# Patient Record
Sex: FEMALE | Race: BLACK OR AFRICAN AMERICAN | NOT HISPANIC OR LATINO | Employment: STUDENT
[De-identification: names, ages, dates, MRNs, and addresses within clinical notes are randomized per-mention and may not be internally consistent; named-entity substitution may affect disease eponyms.]

---
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
## 2020-10-02 ENCOUNTER — OFFICE VISIT (OUTPATIENT)
Dept: FAMILY MEDICINE | Facility: CLINIC | Age: 20
End: 2020-10-02
Payer: COMMERCIAL

## 2020-10-02 VITALS
BODY MASS INDEX: 17.27 KG/M2 | DIASTOLIC BLOOD PRESSURE: 75 MMHG | HEIGHT: 67 IN | HEART RATE: 77 BPM | TEMPERATURE: 97.7 F | SYSTOLIC BLOOD PRESSURE: 112 MMHG | OXYGEN SATURATION: 100 % | WEIGHT: 110 LBS

## 2020-10-02 DIAGNOSIS — K59.01 SLOW TRANSIT CONSTIPATION: ICD-10-CM

## 2020-10-02 DIAGNOSIS — R10.13 DYSPEPSIA: Primary | ICD-10-CM

## 2020-10-02 PROCEDURE — 99214 OFFICE O/P EST MOD 30 MIN: CPT | Performed by: FAMILY MEDICINE

## 2020-10-02 RX ORDER — OMEPRAZOLE 40 MG/1
40 CAPSULE, DELAYED RELEASE ORAL DAILY
Qty: 90 CAPSULE | Refills: 0 | Status: SHIPPED | OUTPATIENT
Start: 2020-10-02 | End: 2020-12-31

## 2020-10-02 RX ORDER — POLYETHYLENE GLYCOL 3350 17 G/17G
1 POWDER, FOR SOLUTION ORAL DAILY
Qty: 116 G | Refills: 1 | Status: SHIPPED | OUTPATIENT
Start: 2020-10-02 | End: 2021-02-26

## 2020-10-02 SDOH — HEALTH STABILITY: MENTAL HEALTH: HOW OFTEN DO YOU HAVE A DRINK CONTAINING ALCOHOL?: NEVER

## 2020-10-02 ASSESSMENT — MIFFLIN-ST. JEOR: SCORE: 1306.71

## 2020-10-02 NOTE — PROGRESS NOTES
Janeth is a 20 year old  who presents for   Patient presents with:  Abdominal Pain: Patient states she has been having ongoing stomach oain for some months.   Nausea: Patient states she had two episodes of nausea and vomitting. One occuring yesterday and the day before that.       Assessment and Plan      1. Dyspepsia  Likely dyspepsia suspect ulcer disease.  Differential diagnosis could also include nonulcer dyspepsia reflux or gallbladder disease.  Will check Helicobacter bacteria pylori will start with a stool test hold omeprazole until stool test start omeprazole for symptomatic management will continue to add antibiotics if H. pylori positive.  Don't start omeprazole until collecting stool test    - omeprazole (PRILOSEC) 40 MG DR capsule; Take 1 capsule (40 mg) by mouth daily  Dispense: 90 capsule; Refill: 0  - Helicobacter pylori Antigen Stool; Future    2. Slow transit constipation  One scoop of Miralx daily you can start this right away       Return if symptoms worsen or fail to improve, for contact us if not improved .    There are no discontinued medications.      Morgan Fitzpatrick MD         HPI       Janeth is a 20 year old  who presents for   Patient presents with:  Abdominal Pain: Patient states she has been having ongoing stomach oain for some months.   Nausea: Patient states she had two episodes of nausea and vomitting. One occuring yesterday and the day before that.         Visit [place]  1. Stomach pain  Epigastric pain, also constipated, severe pain, worse with hunger though has poor appetite. Getting worse.  Pain is not worsened by exercise seems to be worsened by high acid foods and not others not specifically with laying down does not seem to go up the abdomen has had some vomiting associated with the vomiting has had no blood.  Patient was seen at [facility] for a CBC she also had labs done metabolic profile complete metabolic profile and these were normal reportedly H. pylori was ordered but patient  "never did it.  2. Constipation  Just this week, understands what foods cause it has had poor diet this week.    Problem, Medication and Allergy Lists were reviewed and updated if needed..  Patient is an established patient of this clinic.         Review of Systems:   Review of Systems  7 point review of symptoms was otherwise negative except as noted in HPI         Physical Exam:     Vitals:    10/02/20 1107   BP: 112/75   BP Location: Left arm   Patient Position: Sitting   Cuff Size: Adult Regular   Pulse: 77   Temp: 97.7  F (36.5  C)   TempSrc: Oral   SpO2: 100%   Weight: 49.9 kg (110 lb)   Height: 1.71 m (5' 7.32\")     Body mass index is 17.06 kg/m .  Vitals were reviewed and were normal     Physical Exam  Vitals signs reviewed.   Constitutional:       General: She is not in acute distress.     Appearance: She is well-developed. She is not diaphoretic.   HENT:      Head: Normocephalic.   Eyes:      General: No scleral icterus.     Conjunctiva/sclera: Conjunctivae normal.   Neck:      Musculoskeletal: Normal range of motion.      Thyroid: No thyromegaly.   Cardiovascular:      Rate and Rhythm: Normal rate and regular rhythm.      Heart sounds: Normal heart sounds. No murmur.   Pulmonary:      Effort: Pulmonary effort is normal. No respiratory distress.      Breath sounds: Normal breath sounds. No wheezing.   Abdominal:      General: Abdomen is flat. Bowel sounds are normal. There is no distension.      Palpations: Abdomen is soft. There is no hepatomegaly or splenomegaly.      Tenderness: There is abdominal tenderness ( Mild) in the epigastric area.   Lymphadenopathy:      Cervical: No cervical adenopathy.   Skin:     General: Skin is warm and dry.   Neurological:      Mental Status: She is alert and oriented to person, place, and time.   Psychiatric:         Behavior: Behavior normal.         Thought Content: Thought content normal.         Judgment: Judgment normal.           Results:   Results are ordered and " pending    Options for treatment and follow-up care were reviewed with the patient. Janeth Parra  engaged in the decision making process and verbalized understanding of the options discussed and agreed with the final plan.  Morgan Fitzpatrick MD  [facility]

## 2020-10-02 NOTE — PATIENT INSTRUCTIONS
Here is the plan from today's visit    1. Dyspepsia  Don't start omeprazole until collecting stool test    - omeprazole (PRILOSEC) 40 MG DR capsule; Take 1 capsule (40 mg) by mouth daily  Dispense: 90 capsule; Refill: 0  - Helicobacter pylori Antigen Stool; Future    2. Slow transit constipation  One scoop of Miralx daily you can start this right away      Please call or return to clinic if your symptoms don't go away.    Follow up plan  Return if symptoms worsen or fail to improve, for contact us if not improved .     Thank you for coming to [facility] today.  Lab Testing:  **If you had lab testing today and your results are reassuring or normal they will be mailed to you or sent through [employer] within 7 days.   **If the lab tests need quick action we will call you with the results.  The phone number we will call with results is # [phone] (home) . If this is not the best number please call our clinic and change the number.  Medication Refills:  If you need any refills please call your pharmacy and they will contact us.   If you need to  your refill at a new pharmacy, please contact the new pharmacy directly. The new pharmacy will help you get your medications transferred faster.   Scheduling:  If you have any concerns about today's visit or wish to schedule another appointment please call our office during normal business hours [phone] (8-5:00 M-F)   eferrals to [facility] please call [phone].   Mammogram Scheduling [phone]     XRay/CT/Ultrasound/MRI Scheduling [phone]    Medical Concerns:  If you have urgent medical concerns please call [phone] at any time of the day.    Morgan Fitzpatrick MD

## 2020-10-05 DIAGNOSIS — R10.13 DYSPEPSIA: ICD-10-CM

## 2020-10-05 PROCEDURE — 87338 HPYLORI STOOL AG IA: CPT | Performed by: FAMILY MEDICINE

## 2020-10-06 LAB — H PYLORI AG STL QL IA: POSITIVE

## 2020-10-07 ENCOUNTER — TELEPHONE (OUTPATIENT)
Dept: FAMILY MEDICINE | Facility: CLINIC | Age: 20
End: 2020-10-07

## 2020-10-07 DIAGNOSIS — K26.9 DUODENAL ULCER DUE TO HELICOBACTER PYLORI: Primary | ICD-10-CM

## 2020-10-07 DIAGNOSIS — B96.81 DUODENAL ULCER DUE TO HELICOBACTER PYLORI: Primary | ICD-10-CM

## 2020-10-07 RX ORDER — METRONIDAZOLE 500 MG/1
500 TABLET ORAL 2 TIMES DAILY
Qty: 28 TABLET | Refills: 0 | Status: SHIPPED | OUTPATIENT
Start: 2020-10-07 | End: 2020-10-21

## 2020-10-07 RX ORDER — AMOXICILLIN 500 MG/1
1000 TABLET, FILM COATED ORAL 2 TIMES DAILY
Qty: 56 TABLET | Refills: 0 | Status: SHIPPED | OUTPATIENT
Start: 2020-10-07 | End: 2020-10-21

## 2020-10-07 NOTE — TELEPHONE ENCOUNTER
3:12 PM  Called patient regarding H Pylori result.  Informed patient of plan   She agreed   1. Duodenal ulcer due to Helicobacter pylori    - amoxicillin (AMOXIL) 500 MG tablet; Take 2 tablets (1,000 mg) by mouth 2 times daily for 14 days  Dispense: 56 tablet; Refill: 0  - metroNIDAZOLE (FLAGYL) 500 MG tablet; Take 1 tablet (500 mg) by mouth 2 times daily for 14 days  Dispense: 28 tablet; Refill: 0

## 2020-12-27 ENCOUNTER — HEALTH MAINTENANCE LETTER (OUTPATIENT)
Age: 20
End: 2020-12-27

## 2021-02-26 ENCOUNTER — OFFICE VISIT (OUTPATIENT)
Dept: FAMILY MEDICINE | Facility: CLINIC | Age: 21
End: 2021-02-26
Payer: COMMERCIAL

## 2021-02-26 VITALS
WEIGHT: 115 LBS | TEMPERATURE: 98.1 F | OXYGEN SATURATION: 98 % | BODY MASS INDEX: 17.43 KG/M2 | SYSTOLIC BLOOD PRESSURE: 119 MMHG | HEIGHT: 68 IN | DIASTOLIC BLOOD PRESSURE: 75 MMHG | HEART RATE: 77 BPM

## 2021-02-26 DIAGNOSIS — L70.0 ACNE VULGARIS: ICD-10-CM

## 2021-02-26 DIAGNOSIS — R10.13 EPIGASTRIC PAIN: Primary | ICD-10-CM

## 2021-02-26 DIAGNOSIS — L60.8 DISCOLORED NAILS: ICD-10-CM

## 2021-02-26 DIAGNOSIS — K59.01 SLOW TRANSIT CONSTIPATION: ICD-10-CM

## 2021-02-26 PROCEDURE — 99213 OFFICE O/P EST LOW 20 MIN: CPT | Performed by: FAMILY MEDICINE

## 2021-02-26 RX ORDER — POLYETHYLENE GLYCOL 3350 17 G/17G
1 POWDER, FOR SOLUTION ORAL DAILY
Qty: 850 G | Refills: 3 | Status: SHIPPED | OUTPATIENT
Start: 2021-02-26 | End: 2023-05-24

## 2021-02-26 ASSESSMENT — MIFFLIN-ST. JEOR: SCORE: 1336.89

## 2021-02-26 NOTE — PATIENT INSTRUCTIONS
Abdominal Pain  1. Continue Miralax. Use one capful daily for one to two months then decrease to 1/2 a capful daily.  - Provided refill on MiraLax  2. Prune juice, dried cherries, raisins, spinach can help soften stools  3. If your symptoms haven't improved in two weeks, we will retest for H.pylori.  - Call in two weeks to  stool kit for test if needed - you will need to schedule a lab only appointment     Derm Issue  1. If you experience flaking of the scalp skin or elbows, please let me know.    Acne  1. You can use any topical acne cream for back acne

## 2021-02-26 NOTE — PROGRESS NOTES
Assessment & Plan     Epigastric pain, Slow transit constipation  - Helicobacter pylori Antigen Stool; Future  - polyethylene glycol (MIRALAX) 17 GM/Dose powder; Take 17 g (1 capful) by mouth daily    Discolored nails    Acne vulgaris    Patient Instructions   Abdominal Pain  1. Continue Miralax. Use one capful daily for one to two months then decrease to 1/2 a capful daily.  - Provided refill on MiraLax  2. Prune juice, dried cherries, raisins, spinach can help soften stools  3. If your symptoms haven't improved in two weeks, we will retest for H.pylori.  - Call in two weeks to  stool kit for test if needed - you will need to schedule a lab only appointment     Derm Issue  1. If you experience flaking of the scalp skin or elbows, please let me know.    Acne  1. You can use any topical acne cream for back acne       Follow up PRN.    No follow-ups on file.    Marta Garrett MD  [facility] KENJI Fowler is a 21 year old who presents for the following health issues  accompanied by her :    HPI     Derm  Noticed line through right nail and left toenail in 2020. Has not been treated for malaria. Traveled to [place] in [phone], experienced constipation during travel but no other sxs.    GI  Reports poor appetite r/t abd pain that started in Oct. 2020. Tested positive in Oct. for H.pylori (stool test), took ABx for 14 days with improvement, but then abd pain returned two weeks ago and sxs have been worsening since. Points to the epigastrium as the region of pain. Taking MiraLax and prune juice for two days with improvement, no other medications. Denies heart burn, melena, hematochezia and tarry/sticky stools. Endorses occasional nausea, dizziness, poor sleep r/t abd pain.    Social Hx  7 siblings, lives with family. 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       Review of Systems     Negative for: heart burn, melena, hematochezia and tarry/sticky stools    Positive  "for: occasional nausea, dizziness, poor sleep r/t abd pain.    See HPI for additional sxs.    This document serves as a record of the services and decisions personally performed and made by Marta Garrett MD. It was created on his/her behalf by Zafar Reyes, a trained medical scribe. The creation of this document is based the provider's statements to the medical scribe.  Scribe Zafar Reyes 11:15 AM, February 26, 2021        Objective    Blood Pressure 119/75   Pulse 77   Temperature 98.1  F (36.7  C) (Oral)   Height 1.73 m (5' 8.11\")   Weight 52.2 kg (115 lb)   Last Menstrual Period 02/03/2021 (Exact Date)   Oxygen Saturation 98%   Body Mass Index 17.43 kg/m    Body mass index is 17.43 kg/m .   Vitals were reviewed and were normal.    Wt Readings from Last 10 Encounters:   02/26/21 52.2 kg (115 lb)   10/02/20 49.9 kg (110 lb)   04/05/16 50.3 kg (111 lb) (32 %, Z= -0.48)*     * Growth percentiles are based on CDC (Girls, 2-20 Years) data.     Physical Exam   GENERAL: healthy, alert and no distress  RESP: lungs clear to auscultation - no rales, rhonchi or wheezes  CV: regular rate and rhythm, normal S1 S2, no S3 or S4, no murmur, click or rub, no peripheral edema and peripheral pulses strong  ABDOMEN:  bowel sounds normal. abd is soft, non tender, points to LUQ to periumblical region as site of pain. Palpable loop of bowel in LLQ, abd feels slightly full.  SKIN: Right hand second digit 2 mm wide band melanotic line through the nail plate extending all the way to the cuticle.    Second left toe -  1 mm wide band melanotic line throught the nail plate extending all the way to the cuticle   Some mild pap pustle acne upper back  PSYCH: mentation appears normal, affect normal  "

## 2021-03-06 ENCOUNTER — HEALTH MAINTENANCE LETTER (OUTPATIENT)
Age: 21
End: 2021-03-06

## 2021-10-09 ENCOUNTER — HEALTH MAINTENANCE LETTER (OUTPATIENT)
Age: 21
End: 2021-10-09

## 2022-01-17 ENCOUNTER — OFFICE VISIT (OUTPATIENT)
Dept: FAMILY MEDICINE | Facility: CLINIC | Age: 22
End: 2022-01-17
Payer: COMMERCIAL

## 2022-01-17 VITALS
RESPIRATION RATE: 16 BRPM | BODY MASS INDEX: 16.91 KG/M2 | HEIGHT: 68 IN | WEIGHT: 111.6 LBS | HEART RATE: 76 BPM | DIASTOLIC BLOOD PRESSURE: 70 MMHG | SYSTOLIC BLOOD PRESSURE: 110 MMHG | TEMPERATURE: 98.1 F | OXYGEN SATURATION: 100 %

## 2022-01-17 DIAGNOSIS — H93.11 TINNITUS, RIGHT: Primary | ICD-10-CM

## 2022-01-17 DIAGNOSIS — Z23 NEED FOR PROPHYLACTIC VACCINATION AND INOCULATION AGAINST INFLUENZA: ICD-10-CM

## 2022-01-17 DIAGNOSIS — H90.3 ASYMMETRICAL SENSORINEURAL HEARING LOSS: ICD-10-CM

## 2022-01-17 PROCEDURE — 90686 IIV4 VACC NO PRSV 0.5 ML IM: CPT | Performed by: FAMILY MEDICINE

## 2022-01-17 PROCEDURE — 99213 OFFICE O/P EST LOW 20 MIN: CPT | Mod: 25 | Performed by: FAMILY MEDICINE

## 2022-01-17 PROCEDURE — 90471 IMMUNIZATION ADMIN: CPT | Performed by: FAMILY MEDICINE

## 2022-01-17 RX ORDER — NEOMYCIN SULFATE, POLYMYXIN B SULFATE, HYDROCORTISONE 3.5; 10000; 1 MG/ML; [USP'U]/ML; MG/ML
SOLUTION/ DROPS AURICULAR (OTIC)
COMMUNITY
Start: 2022-01-06 | End: 2023-05-04

## 2022-01-17 RX ORDER — CEFDINIR 300 MG/1
CAPSULE ORAL
COMMUNITY
Start: 2022-01-06 | End: 2023-05-04

## 2022-01-17 ASSESSMENT — MIFFLIN-ST. JEOR: SCORE: 1316.46

## 2022-01-17 NOTE — PATIENT INSTRUCTIONS
Patient Education   Here is the plan from today's visit    1. Tinnitus, right  2. Asymmetrical sensorineural hearing loss  Normal to have these symptoms for up to a month after an ear infection. If symptoms persist past 2/6/22, you should see the ear specialist - referral placed today  - Otolaryngology Referral; Future    Please call or return to clinic if your symptoms don't go away.    Follow up plan  No follow-ups on file.    Thank you for coming to [facility] today.  Lab Testing:  **If you had lab testing today and your results are reassuring or normal they will be mailed to you or sent through [employer] within 7 days.   **If the lab tests need quick action we will call you with the results.  **If you are having labs done on a different day, please call [phone] to schedule at [facility] or [phone] for other [facility] Outpatient Lab locations. Labs do not offer walk-in appointments.  The phone number we will call with results is # [phone] (home) . If this is not the best number please call our clinic and change the number.  Medication Refills:  If you need any refills please call your pharmacy and they will contact us.   If you need to  your refill at a new pharmacy, please contact the new pharmacy directly. The new pharmacy will help you get your medications transferred faster.   Scheduling:  If you have any concerns about today's visit or wish to schedule another appointment please call our office during normal business hours [phone] (8-5:00 M-F)  If a referral was made to an [facility] specialty provider and you do not get a call from central scheduling, please refer to directions on your visit summary or call our office during normal business hours for assistance.   If a Mammogram was ordered for you at the [facility] call [phone] to schedule or change your appointment.  If you had an XRay/CT/Ultrasound/MRI ordered the number is [phone] to schedule or  change your radiology appointment.   [facility] has limited ultrasound appointments available on Wednesdays, if you would like your ultrasound at [facility], please call [phone] to schedule.   Medical Concerns:  If you have urgent medical concerns please call [phone] at any time of the day.    Renetta Samayoa MD

## 2022-01-17 NOTE — PROGRESS NOTES
Assessment & Plan   Patient Instructions   Patient Education   Here is the plan from today's visit    1. Tinnitus, right  2. Asymmetrical sensorineural hearing loss  Normal to have these symptoms for up to a month after an ear infection. If symptoms persist past 2/6/22, you should see the ear specialist - referral placed today  - Otolaryngology Referral; Future    20 minutes spent on the date of the encounter doing chart review, history and exam, documentation and further activities per the note      No follow-ups on file.    Renetta Samayoa MD  [facility] KENJI Fowler is a 22 year old who presents for the following health issues   HPI     1/6/22 ED/UC visit with acute otitis externa. Prescribed cortisporin drops and PO cefdinir for 7 days - completed course. No change in symptoms  Continues to have Right tinnitus and decreased hearing acuity and drainage.   No fevers  11/30/21 Clinic visit with acute suppurative otitis media right. Prescribed augmentin x 7 days and zofran.       Review of Systems         Objective    There were no vitals taken for this visit.  There is no height or weight on file to calculate BMI.  Physical Exam  HENT:      Right Ear: Ear canal and external ear normal. No drainage. Tympanic membrane is not injected or perforated.      Left Ear: Ear canal and external ear normal. No drainage. Tympanic membrane is not injected or perforated.      Ears:      Comments: Mclaughlin lateralizes to the left. Air>Bone conduction bilaterally.

## 2022-01-23 ENCOUNTER — HEALTH MAINTENANCE LETTER (OUTPATIENT)
Age: 22
End: 2022-01-23

## 2022-09-06 ENCOUNTER — TRANSFERRED RECORDS (OUTPATIENT)
Dept: HEALTH INFORMATION MANAGEMENT | Facility: CLINIC | Age: 22
End: 2022-09-06

## 2022-09-11 ENCOUNTER — HEALTH MAINTENANCE LETTER (OUTPATIENT)
Age: 22
End: 2022-09-11

## 2022-09-20 NOTE — TELEPHONE ENCOUNTER
FUTURE VISIT INFORMATION      FUTURE VISIT INFORMATION:    Date: 10/25/22    Time: 9:00am    Location: [facility]  REFERRAL INFORMATION:    Referring provider:  Renetta Samayoa MD    Referring providers clinic:  [facility]    Reason for visit/diagnosis  Pt states that she is not having ringing just will like her ears cleaned . Declined ENT    RECORDS REQUESTED FROM:       Clinic name Comments Records Status Imaging Status   [facility]  OV/referral 1/17/22- Renetta Samayoa MD [facility] ED ED visit 1/6/22 [facility]

## 2022-10-25 ENCOUNTER — PRE VISIT (OUTPATIENT)
Dept: SURGERY | Facility: CLINIC | Age: 22
End: 2022-10-25

## 2022-10-25 ENCOUNTER — OFFICE VISIT (OUTPATIENT)
Dept: OTOLARYNGOLOGY | Facility: CLINIC | Age: 22
End: 2022-10-25
Attending: FAMILY MEDICINE
Payer: COMMERCIAL

## 2022-10-25 VITALS
HEIGHT: 67 IN | SYSTOLIC BLOOD PRESSURE: 116 MMHG | DIASTOLIC BLOOD PRESSURE: 75 MMHG | WEIGHT: 120 LBS | OXYGEN SATURATION: 100 % | BODY MASS INDEX: 18.83 KG/M2 | HEART RATE: 77 BPM

## 2022-10-25 DIAGNOSIS — H93.11 TINNITUS, RIGHT: ICD-10-CM

## 2022-10-25 DIAGNOSIS — H90.3 ASYMMETRICAL SENSORINEURAL HEARING LOSS: ICD-10-CM

## 2022-10-25 PROCEDURE — 92504 EAR MICROSCOPY EXAMINATION: CPT | Performed by: REGISTERED NURSE

## 2022-10-25 PROCEDURE — 99203 OFFICE O/P NEW LOW 30 MIN: CPT | Mod: 25 | Performed by: REGISTERED NURSE

## 2022-10-25 RX ORDER — CIPROFLOXACIN AND DEXAMETHASONE 3; 1 MG/ML; MG/ML
SUSPENSION/ DROPS AURICULAR (OTIC)
COMMUNITY
Start: 2022-10-19 | End: 2023-05-04

## 2022-10-25 ASSESSMENT — PAIN SCALES - GENERAL: PAINLEVEL: EXTREME PAIN (8)

## 2022-10-25 NOTE — NURSING NOTE
"Chief Complaint   Patient presents with     Consult     Ear pain         Blood pressure 116/75, pulse 77, height 1.702 m (5' 7\"), weight 54.4 kg (120 lb), SpO2 100 %, not currently breastfeeding.    Tonia Ambrosio, EMT    "

## 2022-10-25 NOTE — PROGRESS NOTES
Otolaryngology Clinic  October 25, 2022    Chief Complaint:   Right tinnitus and dizziness       History of Present Illness:   Janeth Parra is a 22 year old female who presents today for evaluation of right tinnitus and dizziness. Patient scheduled for an ear cleaning per appointment notes. No audiogram completed prior to this visit. Patient reports that she has had these symptoms since experiencing acute otitis externa on 1/6/22. Was seen by PCP in late January 2022 with recommendations to consult with ENT if symptoms did not improve over the course of a couple of weeks.     Today, patient reports ongoing bothersome right tinnitus and decreased hearing on the right side. More bothersome, however, is patient's severe dizziness and vertigo. Patient in bed for an entire day yesterday due to vertigo. Typically lasts a few hours a day.     Patient denies any otalgia, otorrhea, facial numbness/weakness, history of frequent ear infections, or ear surgeries.      Past Medical History:  No past medical history on file.    Past Surgical History:  No past surgical history on file.    Medications:  Current Outpatient Medications   Medication Sig Dispense Refill     amoxicillin-clavulanate (AUGMENTIN) 875-125 MG tablet        CIPRODEX 0.3-0.1 % otic suspension        cefdinir (OMNICEF) 300 MG capsule  (Patient not taking: Reported on 1/17/2022)       neomycin-polymyxin-hydrocortisone (CORTISPORIN) 3.5-96919-1 otic solution  (Patient not taking: Reported on 1/17/2022)       polyethylene glycol (MIRALAX) 17 GM/Dose powder Take 17 g (1 capful) by mouth daily (Patient not taking: Reported on 10/25/2022) 850 g 3       Allergies:  No Known Allergies     Social History:  Social History     Tobacco Use     Smoking status: Never     Smokeless tobacco: Never   Substance Use Topics     Alcohol use: Never     Drug use: Never       ROS: 10 point ROS neg other than the symptoms noted above in the HPI.    Physical Exam:    /75  "(BP Location: Right arm, Patient Position: Sitting, Cuff Size: Adult Regular)   Pulse 77   Ht 1.702 m (5' 7\")   Wt 54.4 kg (120 lb)   SpO2 100%   BMI 18.79 kg/m       Constitutional:  The patient was well-groomed, and in no acute distress.     Neurologic: Alert and oriented x 3.  CN's III-XII within normal limits.  Voice normal.   Psychiatric: The patient's affect was calm, cooperative, and appropriate.     Communication:  Normal; communicates verbally, normal voice quality.    Respiratory: Breathing comfortably without stridor or exertion of accessory muscles.    Ears: Pinnae and tragus non-tender.  EAC's and TM's were clear.  Mclaughlin: lateralizes to left. Rinne: A>B bilaterally      Otologic microscope exam:    Right ear was examined under the microscope.  Normal appearing TM, nicely aerated middle ear space.  Left ear was also examined under the microscope.  Normal appearing TM, nicely aerated middle ear space.       Assessment and Plan:  1. Tinnitus, right  Presents today requesting ear cleaning due to right tinnitus and hearing loss. Ear canals are clean and dry without cerumen. No effusion present on exam. Tuning fork exam is abnormal. Recommend that patient obtain an audiogram and return to clinic for appointment to review audiogram.    2. Vertigo  Will review audiogram. Patient may require further balance testing but will review again after audiogram.      Patient will follow up after audiogram.    Alena Simmons DNP, APRN, CNP  Otolaryngology  Head & Neck Surgery  [phone]    30 minutes spent on the date of the encounter doing chart review, history and exam, documentation and further activities per the note    "

## 2022-10-25 NOTE — PATIENT INSTRUCTIONS
- You were seen in the ENT Clinic today by Alena Simmons NP.   - The plan is follow up with audiogram prior to appointment.  - Please send a [employer] message or call the ENT clinic at [phone] with any questions or concerns.

## 2022-10-25 NOTE — LETTER
10/25/2022       RE: Janeth Parra  [address]   [place] [facility] [place] [address]     Dear Colleague,    Thank you for referring your patient, Janeth Parra, to the [facility] [place] [facility]. Please see a copy of my visit note below.      Otolaryngology Clinic  October 25, 2022    Chief Complaint:   Right tinnitus and dizziness       History of Present Illness:   Janeth Parra is a 22 year old female who presents today for evaluation of right tinnitus and dizziness. Patient scheduled for an ear cleaning per appointment notes. No audiogram completed prior to this visit. Patient reports that she has had these symptoms since experiencing acute otitis externa on 1/6/22. Was seen by PCP in late January 2022 with recommendations to consult with ENT if symptoms did not improve over the course of a couple of weeks.     Today, patient reports ongoing bothersome right tinnitus and decreased hearing on the right side. More bothersome, however, is patient's severe dizziness and vertigo. Patient in bed for an entire day yesterday due to vertigo. Typically lasts a few hours a day.     Patient denies any otalgia, otorrhea, facial numbness/weakness, history of frequent ear infections, or ear surgeries.      Past Medical History:  No past medical history on file.    Past Surgical History:  No past surgical history on file.    Medications:  Current Outpatient Medications   Medication Sig Dispense Refill     amoxicillin-clavulanate (AUGMENTIN) 875-125 MG tablet        CIPRODEX 0.3-0.1 % otic suspension        cefdinir (OMNICEF) 300 MG capsule  (Patient not taking: Reported on 1/17/2022)       neomycin-polymyxin-hydrocortisone (CORTISPORIN) 3.5-78772-8 otic solution  (Patient not taking: Reported on 1/17/2022)       polyethylene glycol (MIRALAX) 17 GM/Dose powder Take 17 g (1 capful) by mouth daily (Patient not taking: Reported on  "10/25/2022) 850 g 3       Allergies:  No Known Allergies     Social History:  Social History     Tobacco Use     Smoking status: Never     Smokeless tobacco: Never   Substance Use Topics     Alcohol use: Never     Drug use: Never       ROS: 10 point ROS neg other than the symptoms noted above in the HPI.    Physical Exam:    /75 (BP Location: Right arm, Patient Position: Sitting, Cuff Size: Adult Regular)   Pulse 77   Ht 1.702 m (5' 7\")   Wt 54.4 kg (120 lb)   SpO2 100%   BMI 18.79 kg/m       Constitutional:  The patient was well-groomed, and in no acute distress.     Neurologic: Alert and oriented x 3.  CN's III-XII within normal limits.  Voice normal.   Psychiatric: The patient's affect was calm, cooperative, and appropriate.     Communication:  Normal; communicates verbally, normal voice quality.    Respiratory: Breathing comfortably without stridor or exertion of accessory muscles.    Ears: Pinnae and tragus non-tender.  EAC's and TM's were clear.  Mclaughlin: lateralizes to left. Rinne: A>B bilaterally      Otologic microscope exam:    Right ear was examined under the microscope.  Normal appearing TM, nicely aerated middle ear space.  Left ear was also examined under the microscope.  Normal appearing TM, nicely aerated middle ear space.       Assessment and Plan:  1. Tinnitus, right  Presents today requesting ear cleaning due to right tinnitus and hearing loss. Ear canals are clean and dry without cerumen. No effusion present on exam. Tuning fork exam is abnormal. Recommend that patient obtain an audiogram and return to clinic for appointment to review audiogram.    2. Vertigo  Will review audiogram. Patient may require further balance testing but will review again after audiogram.      Patient will follow up after audiogram.    Alena Simmons DNP, APRN, CNP  Otolaryngology  Head & Neck Surgery  [phone]    30 minutes spent on the date of the encounter doing chart review, history and exam, documentation " and further activities per the note      Again, thank you for allowing me to participate in the care of your patient.      Sincerely,    Julia Simmons, NP

## 2022-11-02 DIAGNOSIS — H93.11 TINNITUS, RIGHT: Primary | ICD-10-CM

## 2022-11-03 DIAGNOSIS — H93.11 TINNITUS, RIGHT: Primary | ICD-10-CM

## 2022-11-08 ENCOUNTER — OFFICE VISIT (OUTPATIENT)
Dept: AUDIOLOGY | Facility: CLINIC | Age: 22
End: 2022-11-08
Payer: COMMERCIAL

## 2022-11-08 ENCOUNTER — OFFICE VISIT (OUTPATIENT)
Dept: OTOLARYNGOLOGY | Facility: CLINIC | Age: 22
End: 2022-11-08
Payer: COMMERCIAL

## 2022-11-08 VITALS
HEART RATE: 83 BPM | WEIGHT: 122 LBS | OXYGEN SATURATION: 100 % | DIASTOLIC BLOOD PRESSURE: 74 MMHG | SYSTOLIC BLOOD PRESSURE: 117 MMHG | TEMPERATURE: 97.5 F | BODY MASS INDEX: 19.11 KG/M2

## 2022-11-08 DIAGNOSIS — H91.20 SUDDEN-ONSET SENSORINEURAL HEARING LOSS: Primary | ICD-10-CM

## 2022-11-08 DIAGNOSIS — H90.3 ASYMMETRICAL SENSORINEURAL HEARING LOSS: ICD-10-CM

## 2022-11-08 DIAGNOSIS — R42 VERTIGO: ICD-10-CM

## 2022-11-08 DIAGNOSIS — H90.41 SENSORINEURAL HEARING LOSS (SNHL) OF RIGHT EAR WITH UNRESTRICTED HEARING OF LEFT EAR: Primary | ICD-10-CM

## 2022-11-08 PROCEDURE — 92557 COMPREHENSIVE HEARING TEST: CPT | Performed by: AUDIOLOGIST-HEARING AID FITTER

## 2022-11-08 PROCEDURE — 92565 STENGER TEST PURE TONE: CPT | Performed by: AUDIOLOGIST-HEARING AID FITTER

## 2022-11-08 PROCEDURE — 92550 TYMPANOMETRY & REFLEX THRESH: CPT | Performed by: AUDIOLOGIST-HEARING AID FITTER

## 2022-11-08 PROCEDURE — 99214 OFFICE O/P EST MOD 30 MIN: CPT | Performed by: REGISTERED NURSE

## 2022-11-08 RX ORDER — PREDNISONE 20 MG/1
TABLET ORAL
Qty: 40 TABLET | Refills: 0 | Status: SHIPPED | OUTPATIENT
Start: 2022-11-08 | End: 2023-05-04

## 2022-11-08 ASSESSMENT — PAIN SCALES - GENERAL: PAINLEVEL: EXTREME PAIN (9)

## 2022-11-08 NOTE — PROGRESS NOTES
AUDIOLOGY REPORT    SUMMARY: Audiology visit completed. See audiogram for results.      RECOMMENDATIONS: Follow-up with ENT.      Bashir Jang, CCC-A, [facility]  Licensed Audiologist  [place] #[id number]

## 2022-11-08 NOTE — LETTER
11/8/2022       RE: Janeth Parra  [address]   [facility] [address]     Dear Colleague,    Thank you for referring your patient, Janeth Parra, to the [facility] [place] [facility]. Please see a copy of my visit note below.      Otolaryngology Clinic  November 8, 2022    Chief Complaint:   Right tinnitus and hearing loss       History of Present Illness:   Janeth Parra is a 22 year old female who presents today for right tinnitus and sudden hearing loss.  Patient states that symptoms started in early January 2022.  Patient reports a right ear infection and hearing loss that has never recovered.  Patient has been seen by multiple outside providers and, unfortunately, we do not have the clinic notes from those visits at today's exam with exception to an audiology visit in May 2022 which reported mild sensory hearing loss in the right ear and normal hearing in the left ear with 100% word recognition score on the right side at 65 dB.    Patient reports that she recently had an MRI which did not find any retrocochlear lesions that could be causing patient's hearing loss.    Patient reports that since June 2022 she feels that her right-sided hearing is getting worse and the tinnitus on the right side is becoming very loud.  She is also now having episodes of vertigo that last minutes to days at a time. Denies any vertigo today. Last vertigo episode was last week. Patient reports a pain in the right ear which, upon further review, seems to be the loud tinnitus that she is hearing in that ear.    Patient denies any otorrhea, facial numbness/weakness, history of frequent ear infections, or ear surgeries.  Patient denies any significant noise trauma, family history of hearing loss, or history of autoimmune disease.    Past Medical History:  No past medical history on file.    Past Surgical History:  No past  surgical history on file.    Medications:  Current Outpatient Medications   Medication Sig Dispense Refill     omeprazole (PRILOSEC) 20 MG DR capsule Take 1 capsule (20 mg) by mouth daily 30 capsule 0     predniSONE (DELTASONE) 20 MG tablet Prednisone taper: Take 3 tablets (60 mg) by mouth daily x 10 days. Then take 2.5 tablets (50 mg) daily x 1 day. Then take 2 tablets (40 mg) daily x 1 day. Then take 1.5 tablets (30 mg) daily x 1 day. Then take 1 tablet (20 mg) daily x 1 day.Then take 0.5 tablet (10 mg) daily x 1 day 40 tablet 0     amoxicillin-clavulanate (AUGMENTIN) 875-125 MG tablet  (Patient not taking: Reported on 11/8/2022)       cefdinir (OMNICEF) 300 MG capsule  (Patient not taking: Reported on 1/17/2022)       CIPRODEX 0.3-0.1 % otic suspension  (Patient not taking: Reported on 11/8/2022)       neomycin-polymyxin-hydrocortisone (CORTISPORIN) 3.5-15473-2 otic solution  (Patient not taking: Reported on 1/17/2022)       polyethylene glycol (MIRALAX) 17 GM/Dose powder Take 17 g (1 capful) by mouth daily (Patient not taking: Reported on 10/25/2022) 850 g 3       Allergies:  No Known Allergies     Social History:  Social History     Tobacco Use     Smoking status: Never     Smokeless tobacco: Never   Substance Use Topics     Alcohol use: Never     Drug use: Never       ROS: 10 point ROS neg other than the symptoms noted above in the HPI.    Physical Exam:    /74 (BP Location: Left arm, Patient Position: Sitting, Cuff Size: Adult Regular)   Pulse 83   Temp 97.5  F (36.4  C) (Temporal)   Wt 55.3 kg (122 lb)   SpO2 100%   BMI 19.11 kg/m       Constitutional:  The patient was well-groomed and in no acute distress.     Neurologic: Alert and oriented x 3.  CN's III-XII within normal limits.  Voice normal.   Psychiatric: The patient's affect was calm, cooperative, and appropriate.    Communication:  Normal; communicates verbally, normal voice quality.   Respiratory: Breathing comfortably without stridor or  exertion of accessory muscles.    Head/Face:  Normocephalic and atraumatic.  No lesions or scars.    Eyes: Pupils were equal and reactive.  Extraocular movement intact.   Ears: Pinnae and tragus non-tender.  EAC's and TM's were clear.       Otologic microscope exam:    Right ear was examined under the microscope.  Normal appearing TM, nicely aerated middle ear space.    Left ear was also examined under the microscope.  Normal appearing TM, nicely aerated middle ear space.       Audiogram: 11/8/2022 - data independently reviewed  Right ear: Moderate rising to mild SNHL   Left ear: Normal heairng   WR right: 44% @ 80 dB left: 100% @ 50 dB  Acoustic Reflexes: present in all conditions  Tympanograms: type A right, type A left        Assessment and Plan:  1. Sudden-onset sensorineural hearing loss  Patient with asymmetric sensorineural hearing loss with tinnitus and vertigo. From patient's history, it seems that hearing is fluctuating and deteriorating over the course of the past few months. Outside MRI is normal per patient report that she showed me on her phone today.     I am suspicious that this may be a Menieres or hydrops process. Because hearing is fluctuating, discussed trying a high dose steroid burst to treat any sudden loss that may be contributing to worsening hearing and word recognition score.     Also discussed Menieres management including low salt diet, Dyazide, and IT injections in acute phases.     Patient will start oral steroid burst and return to clinic in 2-3 weeks for recheck including an audiogram prior to appointment.     - predniSONE (DELTASONE) 20 MG tablet; Prednisone taper: Take 3 tablets (60 mg) by mouth daily x 10 days. Then take 2.5 tablets (50 mg) daily x 1 day. Then take 2 tablets (40 mg) daily x 1 day. Then take 1.5 tablets (30 mg) daily x 1 day. Then take 1 tablet (20 mg) daily x 1 day.Then take 0.5 tablet (10 mg) daily x 1 day  Dispense: 40 tablet; Refill: 0  - omeprazole (PRILOSEC)  20 MG DR capsule; Take 1 capsule (20 mg) by mouth daily  Dispense: 30 capsule; Refill: 0     Alena Simmons DNP, APRN, CNP  Otolaryngology  Head & Neck Surgery  [phone]    30 minutes spent on the date of the encounter doing chart review, history and exam, documentation and further activities per the note      Again, thank you for allowing me to participate in the care of your patient.      Sincerely,    Julia Simmons, NP

## 2022-11-08 NOTE — PROGRESS NOTES
Otolaryngology Clinic  November 8, 2022    Chief Complaint:   Right tinnitus and hearing loss       History of Present Illness:   Janeth Parra is a 22 year old female who presents today for right tinnitus and sudden hearing loss.  Patient states that symptoms started in early January 2022.  Patient reports a right ear infection and hearing loss that has never recovered.  Patient has been seen by multiple outside providers and, unfortunately, we do not have the clinic notes from those visits at today's exam with exception to an audiology visit in May 2022 which reported mild sensory hearing loss in the right ear and normal hearing in the left ear with 100% word recognition score on the right side at 65 dB.    Patient reports that she recently had an MRI which did not find any retrocochlear lesions that could be causing patient's hearing loss.    Patient reports that since June 2022 she feels that her right-sided hearing is getting worse and the tinnitus on the right side is becoming very loud.  She is also now having episodes of vertigo that last minutes to days at a time. Denies any vertigo today. Last vertigo episode was last week. Patient reports a pain in the right ear which, upon further review, seems to be the loud tinnitus that she is hearing in that ear.    Patient denies any otorrhea, facial numbness/weakness, history of frequent ear infections, or ear surgeries.  Patient denies any significant noise trauma, family history of hearing loss, or history of autoimmune disease.    Past Medical History:  No past medical history on file.    Past Surgical History:  No past surgical history on file.    Medications:  Current Outpatient Medications   Medication Sig Dispense Refill     omeprazole (PRILOSEC) 20 MG DR capsule Take 1 capsule (20 mg) by mouth daily 30 capsule 0     predniSONE (DELTASONE) 20 MG tablet Prednisone taper: Take 3 tablets (60 mg) by mouth daily x 10 days. Then take 2.5 tablets (50 mg)  daily x 1 day. Then take 2 tablets (40 mg) daily x 1 day. Then take 1.5 tablets (30 mg) daily x 1 day. Then take 1 tablet (20 mg) daily x 1 day.Then take 0.5 tablet (10 mg) daily x 1 day 40 tablet 0     amoxicillin-clavulanate (AUGMENTIN) 875-125 MG tablet  (Patient not taking: Reported on 11/8/2022)       cefdinir (OMNICEF) 300 MG capsule  (Patient not taking: Reported on 1/17/2022)       CIPRODEX 0.3-0.1 % otic suspension  (Patient not taking: Reported on 11/8/2022)       neomycin-polymyxin-hydrocortisone (CORTISPORIN) 3.5-76141-2 otic solution  (Patient not taking: Reported on 1/17/2022)       polyethylene glycol (MIRALAX) 17 GM/Dose powder Take 17 g (1 capful) by mouth daily (Patient not taking: Reported on 10/25/2022) 850 g 3       Allergies:  No Known Allergies     Social History:  Social History     Tobacco Use     Smoking status: Never     Smokeless tobacco: Never   Substance Use Topics     Alcohol use: Never     Drug use: Never       ROS: 10 point ROS neg other than the symptoms noted above in the HPI.    Physical Exam:    /74 (BP Location: Left arm, Patient Position: Sitting, Cuff Size: Adult Regular)   Pulse 83   Temp 97.5  F (36.4  C) (Temporal)   Wt 55.3 kg (122 lb)   SpO2 100%   BMI 19.11 kg/m       Constitutional:  The patient was well-groomed and in no acute distress.     Neurologic: Alert and oriented x 3.  CN's III-XII within normal limits.  Voice normal.   Psychiatric: The patient's affect was calm, cooperative, and appropriate.    Communication:  Normal; communicates verbally, normal voice quality.   Respiratory: Breathing comfortably without stridor or exertion of accessory muscles.    Head/Face:  Normocephalic and atraumatic.  No lesions or scars.    Eyes: Pupils were equal and reactive.  Extraocular movement intact.   Ears: Pinnae and tragus non-tender.  EAC's and TM's were clear.       Otologic microscope exam:    Right ear was examined under the microscope.  Normal appearing TM,  nicely aerated middle ear space.    Left ear was also examined under the microscope.  Normal appearing TM, nicely aerated middle ear space.       Audiogram: 11/8/2022 - data independently reviewed  Right ear: Moderate rising to mild SNHL   Left ear: Normal heairng   WR right: 44% @ 80 dB left: 100% @ 50 dB  Acoustic Reflexes: present in all conditions  Tympanograms: type A right, type A left        Assessment and Plan:  1. Sudden-onset sensorineural hearing loss  Patient with asymmetric sensorineural hearing loss with tinnitus and vertigo. From patient's history, it seems that hearing is fluctuating and deteriorating over the course of the past few months. Outside MRI is normal per patient report that she showed me on her phone today.     I am suspicious that this may be a Menieres or hydrops process. Because hearing is fluctuating, discussed trying a high dose steroid burst to treat any sudden loss that may be contributing to worsening hearing and word recognition score.     Also discussed Menieres management including low salt diet, Dyazide, and IT injections in acute phases.     Patient will start oral steroid burst and return to clinic in 2-3 weeks for recheck including an audiogram prior to appointment.     - predniSONE (DELTASONE) 20 MG tablet; Prednisone taper: Take 3 tablets (60 mg) by mouth daily x 10 days. Then take 2.5 tablets (50 mg) daily x 1 day. Then take 2 tablets (40 mg) daily x 1 day. Then take 1.5 tablets (30 mg) daily x 1 day. Then take 1 tablet (20 mg) daily x 1 day.Then take 0.5 tablet (10 mg) daily x 1 day  Dispense: 40 tablet; Refill: 0  - omeprazole (PRILOSEC) 20 MG DR capsule; Take 1 capsule (20 mg) by mouth daily  Dispense: 30 capsule; Refill: 0     Alena Simmons DNP, APRN, CNP  Otolaryngology  Head & Neck Surgery  [phone]    30 minutes spent on the date of the encounter doing chart review, history and exam, documentation and further activities per the note

## 2022-11-18 ENCOUNTER — THERAPY VISIT (OUTPATIENT)
Dept: PHYSICAL THERAPY | Facility: CLINIC | Age: 22
End: 2022-11-18
Attending: REGISTERED NURSE
Payer: COMMERCIAL

## 2022-11-18 DIAGNOSIS — R42 VERTIGO: ICD-10-CM

## 2022-11-18 PROCEDURE — 97112 NEUROMUSCULAR REEDUCATION: CPT | Mod: GP | Performed by: PHYSICAL THERAPIST

## 2022-11-18 PROCEDURE — 97161 PT EVAL LOW COMPLEX 20 MIN: CPT | Mod: GP | Performed by: PHYSICAL THERAPIST

## 2022-11-18 NOTE — DISCHARGE INSTRUCTIONS
Next appointments with me:  Tuesday 11/22 at 1:45PM  Thursday 12/1 at 10:15AM    -Eye exercise 3 times a day for 1 minute each  - Monitor the vertigo when laying down/getting up out of bed. We will look at that again next time    Myra Joshua PT, DPT  Physical Therapist  [facility] [place] [address] [place] [address]  [email or web address]  Appointments: [phone]

## 2022-11-21 NOTE — PROGRESS NOTES
11/18/22 1000   Quick Adds   Quick Adds Vestibular Eval   General Information   Start of Care Date 11/18/22   Referring Physician Julia Simmons NP   Orders Evaluate and Treat as Indicated   Order Date 11/08/22   Medical Diagnosis Vertigo   Onset of illness/injury or Date of Surgery 11/08/22   Surgical/Medical history reviewed Yes   Pertinent history of current problem (include personal factors and/or comorbidities that impact the POC) Initial episode of sudden R sided hearing loss in January 2022. Patient reports COVID in 12/2022. Patient reports vertigo when getting out of bed- associated with nausea, imbalance, lasting 20-25 minutes, occurs every morning/night. Other symptoms include headaches (usually associated with dizziness)- significant increase since 2022. Patient reports significant tinnitus in R ear, but also can hear heartbeat in her right ear. Sensitivity to sounds, as well, but does not usually provoke dizziness. Occasional sensitivity to light- most notable when having headaches. Patient denies difficulty swallowing, speaking.   Pertinent Visual History  Some blurry vision reported- mostly in busy environments   Prior level of function comment Independent-active   Patient role/Employment history Student   Assistive Devices Comments None   Patient/Family Goals Statement To decrease dizziness   Fall Risk Screen   Fall screen completed by PT   Have you fallen 2 or more times in the past year? No   Have you fallen and had an injury in the past year? No   Is patient a fall risk? No   Functional Scales   Functional Scales and Outcomes DHI 32   Cognitive Status Examination   Orientation orientation to person, place and time   Level of Consciousness alert   Follows Commands and Answers Questions 100% of the time;able to follow multistep instructions   Personal Safety and Judgment intact   Memory intact   Bed Mobility   Bed Mobility Comments Independent   Transfer Skills   Transfer Comments Independent    Gait   Gait Comments Patient ambulates without a device independently-mild reduction in anabel, reduced head motion and armswing bilaterally   Balance   Balance Comments Deferred due to BPPV findings today   Cervicogenic Screen   Neck ROM WNL   Oculomotor Exam   Smooth Pursuit Normal   Saccades Normal   VOR Abnormal   VOR Comments retinal slip horizontal   Rapid Head Thrust Normal   Convergence Testing Normal   Infrared Goggle Exam or Frenzel Lense Exam   Vestibular Suppressant in Last 24 Hours? No   Exam completed with Infrared Goggles   Spontaneous Nystagmus Negative   Gaze Evoked Nystagmus Negative   Head Shake Horizontal Nystagmus Negative   Head Shake Horizontal Nystagmus comments increased dizziness- no nystagmus   [place]-Hallpike (right) Negative   Cindy-Hallpike (right) comments x 2 at different speeds. Increased symptoms when returning to sitting   Cindy-Hallpike (Left) Negative   [place]-Hallpike (left) comments Increased symptoms when returning to sitting   HSCC Supine Roll Test (Right) Negative   HSCC Supine Roll Test (Left) Negative   Dynamic Visual Acuity (DVA)   Static Acuity (LogMar) -0.1   Horizontal Head Movement at 1 Hz (LogMar) 0.2   Horizontal Head Movement at 2 Hz (LogMar) 0.3   DVA Comments 4 line loss- abnormal   Planned Therapy Interventions   Planned Therapy Interventions balance training;other (see comments);ROM;stretching;strengthening;transfer training;manual therapy;neuromuscular re-education;gait training  (CRMs)   Clinical Impression   Criteria for Skilled Therapeutic Interventions Met yes, treatment indicated   PT Diagnosis Dizziness   Influenced by the following impairments Symptoms, ocular motor examination, balance   Functional limitations due to impairments Decreased participation in daily activities-ADLs, IADLs.  Increased symptoms.   Clinical Presentation Stable/Uncomplicated   Clinical Presentation Rationale Personal factors, body systems involved   Clinical Decision Making  (Complexity) Low complexity   Therapy Frequency 1 time/week   Predicted Duration of Therapy Intervention (days/wks) 3-4 weeks   Risk & Benefits of therapy have been explained Yes   Patient, Family & other staff in agreement with plan of care Yes   Clinical Impression Comments Patient is a 22-year-old female who presents to outpatient physical therapy status post sudden right-sided hearing loss in January 2022 following COVID infection December 2021.  Patient then reported vertigo beginning June 2022 and followed up in the ENT clinic for assessment.  On today's exam patient demonstrates impaired gaze stability, symptoms with positional testing (no nystagmus actually present) and mild imbalance.  Patient would benefit from skilled PT services for functional mobility upgrading, symptom management in order to return to prior level function.  Recommend ongoing assessment for possible BPPV due to history and symptoms with positional testing, as well as possibility of vestibular weakness   Education Assessment   Preferred Learning Style Demonstration;Pictures/video   Barriers to Learning No barriers   GOALS   PT Eval Goals 1;2;3;4   Goal 1   Goal Identifier DHI   Goal Description Patient will score less than 15 on the DHI to reduce dizzy symptoms during functional activities   Target Date 02/15/23   Goal 2   Goal Identifier FGA   Goal Description Patient will perform the FGA and score 30/30 to reduce fall risk   Target Date 02/15/23   Goal 3   Goal Identifier DVA   Goal Description Patient will score less than 3 line loss on the DVA to improve gaze stability for symptom management   Target Date 02/15/23   Goal 4   Goal Identifier HEP   Goal Description Patient will be independent home exercise program for maintenance of therapy gains at discharge   Target Date 02/15/23   Total Evaluation Time   PT Eval, Low Complexity Minutes (70469) 20  (Patient late)     Myra Joshua PT, DPT  Physical Therapist  [facility]   [facility] - [address]  [address] [place] [address]  [email or web address]  Appointments: [phone]

## 2022-11-22 ENCOUNTER — THERAPY VISIT (OUTPATIENT)
Dept: PHYSICAL THERAPY | Facility: CLINIC | Age: 22
End: 2022-11-22
Payer: COMMERCIAL

## 2022-11-22 DIAGNOSIS — R42 VERTIGO: Primary | ICD-10-CM

## 2022-11-22 PROCEDURE — 97750 PHYSICAL PERFORMANCE TEST: CPT | Mod: GP | Performed by: PHYSICAL THERAPIST

## 2022-11-22 PROCEDURE — 97112 NEUROMUSCULAR REEDUCATION: CPT | Mod: GP | Performed by: PHYSICAL THERAPIST

## 2022-12-29 DIAGNOSIS — H90.3 ASYMMETRICAL SENSORINEURAL HEARING LOSS: Primary | ICD-10-CM

## 2022-12-30 ENCOUNTER — TELEPHONE (OUTPATIENT)
Dept: OTOLARYNGOLOGY | Facility: CLINIC | Age: 22
End: 2022-12-30

## 2022-12-30 NOTE — TELEPHONE ENCOUNTER
[facility] Call Center    Phone Message    May a detailed message be left on voicemail: yes     Reason for Call: Other: Pt calling in regards to wanting to schedule a nose surgery . I see pt is already schedled on 1/9 . Pt states that she will like to schedule surgery prior to that  appt . Please reach out to pt to discuss . Thank you      Action Taken: Message routed to:  [facility] ([facility] ENT     Travel Screening: Not Applicable

## 2022-12-30 NOTE — TELEPHONE ENCOUNTER
LVM that patient as never seen an ENT provider here for any nose related issues. Patient will need a referral before scheduling a consult if wanting an appt at [facility]. Patient can schedule at a community clinic without a referral. Gave call center number for questions

## 2023-01-09 ENCOUNTER — OFFICE VISIT (OUTPATIENT)
Dept: OTOLARYNGOLOGY | Facility: CLINIC | Age: 23
End: 2023-01-09
Payer: COMMERCIAL

## 2023-01-09 ENCOUNTER — OFFICE VISIT (OUTPATIENT)
Dept: AUDIOLOGY | Facility: CLINIC | Age: 23
End: 2023-01-09
Payer: COMMERCIAL

## 2023-01-09 VITALS
OXYGEN SATURATION: 100 % | HEIGHT: 67 IN | BODY MASS INDEX: 20.09 KG/M2 | WEIGHT: 128 LBS | DIASTOLIC BLOOD PRESSURE: 72 MMHG | HEART RATE: 75 BPM | SYSTOLIC BLOOD PRESSURE: 122 MMHG

## 2023-01-09 DIAGNOSIS — G43.109 VERTIGINOUS MIGRAINE: Primary | ICD-10-CM

## 2023-01-09 DIAGNOSIS — H90.3 ASYMMETRICAL SENSORINEURAL HEARING LOSS: ICD-10-CM

## 2023-01-09 DIAGNOSIS — H90.41 SENSORINEURAL HEARING LOSS (SNHL) OF RIGHT EAR WITH UNRESTRICTED HEARING OF LEFT EAR: Primary | ICD-10-CM

## 2023-01-09 PROCEDURE — 99213 OFFICE O/P EST LOW 20 MIN: CPT | Performed by: REGISTERED NURSE

## 2023-01-09 PROCEDURE — 92557 COMPREHENSIVE HEARING TEST: CPT | Performed by: AUDIOLOGIST-HEARING AID FITTER

## 2023-01-09 PROCEDURE — 92550 TYMPANOMETRY & REFLEX THRESH: CPT | Performed by: AUDIOLOGIST-HEARING AID FITTER

## 2023-01-09 PROCEDURE — 92565 STENGER TEST PURE TONE: CPT | Performed by: AUDIOLOGIST-HEARING AID FITTER

## 2023-01-09 ASSESSMENT — PAIN SCALES - GENERAL: PAINLEVEL: EXTREME PAIN (8)

## 2023-01-09 NOTE — LETTER
1/9/2023       RE: Janeth Parra  [address]   [facility] [address]     Dear Colleague,    Thank you for referring your patient, Janeht Parra, to the [facility] EAR NOSE AND TH[facility]INIC [place] at [facility]. Please see a copy of my visit note below.      Otolaryngology Clinic  January 9, 2023    Chief Complaint:   Right tinnitus and hearing loss      History of Present Illness:   Janeth Parra is a 23 year old female who presents today for follow up after oral steroid burst for possible sudden sensorineural hearing loss. Patient states that symptoms started in early January 2022.  Patient reports a right ear infection and hearing loss that has never recovered.  Patient has been seen by multiple outside providers. MRI completed on the outside was negative for any retrocochlear lesion. Based on previous outside audiograms, patient's hearing on the right side has progressively worsened over the last 6 months. Patient also has severe dizziness, lasting minutes to days at a time. Since last visit, patient has been evaluated by vestibular PT who felt that patient's vertigo symptoms were likely central, related to vertiginous migraines. Patient does report that dizzy episodes are always associated with migraine headaches.    Today, patient returns for follow up. No change in symptoms or hearing with oral steroid medication. Continues to have dizzy/headache episodes which are patient's most bothersome symptoms at this time. Right hearing loss is stable. Right tinnitus is bothersome.     Past Medical History:  No past medical history on file.    Past Surgical History:  No past surgical history on file.    Medications:  Current Outpatient Medications   Medication Sig Dispense Refill     amoxicillin-clavulanate (AUGMENTIN) 875-125 MG tablet  (Patient not taking: Reported on 11/8/2022)       cefdinir (OMNICEF) 300 MG capsule  (Patient not  "taking: Reported on 1/17/2022)       CIPRODEX 0.3-0.1 % otic suspension  (Patient not taking: Reported on 11/8/2022)       neomycin-polymyxin-hydrocortisone (CORTISPORIN) 3.5-83255-1 otic solution  (Patient not taking: Reported on 1/9/2023)       omeprazole (PRILOSEC) 20 MG DR capsule Take 1 capsule (20 mg) by mouth daily (Patient not taking: Reported on 1/9/2023) 30 capsule 0     polyethylene glycol (MIRALAX) 17 GM/Dose powder Take 17 g (1 capful) by mouth daily (Patient not taking: Reported on 10/25/2022) 850 g 3     predniSONE (DELTASONE) 20 MG tablet Prednisone taper: Take 3 tablets (60 mg) by mouth daily x 10 days. Then take 2.5 tablets (50 mg) daily x 1 day. Then take 2 tablets (40 mg) daily x 1 day. Then take 1.5 tablets (30 mg) daily x 1 day. Then take 1 tablet (20 mg) daily x 1 day.Then take 0.5 tablet (10 mg) daily x 1 day (Patient not taking: Reported on 1/9/2023) 40 tablet 0       Allergies:  No Known Allergies     Social History:  Social History     Tobacco Use     Smoking status: Never     Smokeless tobacco: Never   Substance Use Topics     Alcohol use: Never     Drug use: Never       ROS: 10 point ROS neg other than the symptoms noted above in the HPI.    Physical Exam:    /72 (BP Location: Right arm, Patient Position: Sitting, Cuff Size: Adult Regular)   Pulse 75   Ht 1.702 m (5' 7\")   Wt 58.1 kg (128 lb)   SpO2 100%   BMI 20.05 kg/m       Constitutional:  The patient was unaccompanied, well-groomed, and in no acute distress.     Neurologic: Alert and oriented x 3.  CN's III-XII within normal limits.  Voice normal.   Psychiatric: The patient's affect was calm, cooperative, and appropriate.     Communication:  Normal; communicates verbally, normal voice quality.    Respiratory: Breathing comfortably without stridor or exertion of accessory muscles.    Ears: Pinnae and tragus non-tender.  EAC's and TM's were clear.      Audiogram: 1/9/2023 - data independently reviewed  Right ear: Moderate " rising to mild sloping to moderate sensorineural hearing loss (stable compared to 11/8/22)  Left ear: normal hearing    WR right: 48% at 85 dB left: 100% at 50 dB  Acoustic Reflexes: present in all conditions  Tympanograms: type A bilaterally     Assessment and Plan:  1. Vertiginous migraine  Patient with persistent episodes of probable vestibular migraines. Evaluated by PT who reported no definite clinical findings of any peripheral vestibular deficit. Recommend follow up with neurology for headache management. Referral placed to Neurology today.    2. Asymmetrical sensorineural hearing loss  Hearing on right side is stable since November 2022. No change with oral steroid burst. Patient is now interested in pursuing a hearing aid for the right ear. Patient is medically cleared for right sided hearing aid. Referral placed to audiology for hearing aid consult.      Patient will follow up in 6 months with repeat audiogram. Sooner for any sudden changes in hearing.    Alena Simmons DNP, APRN, CNP  Otolaryngology  Head & Neck Surgery  [phone]    20 minutes spent on the date of the encounter doing chart review, history and exam, documentation and further activities per the note

## 2023-01-09 NOTE — PROGRESS NOTES
Otolaryngology Clinic  January 9, 2023    Chief Complaint:   Right tinnitus and hearing loss      History of Present Illness:   Janeth Parra is a 23 year old female who presents today for follow up after oral steroid burst for possible sudden sensorineural hearing loss. Patient states that symptoms started in early January 2022.  Patient reports a right ear infection and hearing loss that has never recovered.  Patient has been seen by multiple outside providers. MRI completed on the outside was negative for any retrocochlear lesion. Based on previous outside audiograms, patient's hearing on the right side has progressively worsened over the last 6 months. Patient also has severe dizziness, lasting minutes to days at a time. Since last visit, patient has been evaluated by vestibular PT who felt that patient's vertigo symptoms were likely central, related to vertiginous migraines. Patient does report that dizzy episodes are always associated with migraine headaches.    Today, patient returns for follow up. No change in symptoms or hearing with oral steroid medication. Continues to have dizzy/headache episodes which are patient's most bothersome symptoms at this time. Right hearing loss is stable. Right tinnitus is bothersome.     Past Medical History:  No past medical history on file.    Past Surgical History:  No past surgical history on file.    Medications:  Current Outpatient Medications   Medication Sig Dispense Refill     amoxicillin-clavulanate (AUGMENTIN) 875-125 MG tablet  (Patient not taking: Reported on 11/8/2022)       cefdinir (OMNICEF) 300 MG capsule  (Patient not taking: Reported on 1/17/2022)       CIPRODEX 0.3-0.1 % otic suspension  (Patient not taking: Reported on 11/8/2022)       neomycin-polymyxin-hydrocortisone (CORTISPORIN) 3.5-44436-6 otic solution  (Patient not taking: Reported on 1/9/2023)       omeprazole (PRILOSEC) 20 MG DR capsule Take 1 capsule (20 mg) by mouth daily (Patient not  "taking: Reported on 1/9/2023) 30 capsule 0     polyethylene glycol (MIRALAX) 17 GM/Dose powder Take 17 g (1 capful) by mouth daily (Patient not taking: Reported on 10/25/2022) 850 g 3     predniSONE (DELTASONE) 20 MG tablet Prednisone taper: Take 3 tablets (60 mg) by mouth daily x 10 days. Then take 2.5 tablets (50 mg) daily x 1 day. Then take 2 tablets (40 mg) daily x 1 day. Then take 1.5 tablets (30 mg) daily x 1 day. Then take 1 tablet (20 mg) daily x 1 day.Then take 0.5 tablet (10 mg) daily x 1 day (Patient not taking: Reported on 1/9/2023) 40 tablet 0       Allergies:  No Known Allergies     Social History:  Social History     Tobacco Use     Smoking status: Never     Smokeless tobacco: Never   Substance Use Topics     Alcohol use: Never     Drug use: Never       ROS: 10 point ROS neg other than the symptoms noted above in the HPI.    Physical Exam:    /72 (BP Location: Right arm, Patient Position: Sitting, Cuff Size: Adult Regular)   Pulse 75   Ht 1.702 m (5' 7\")   Wt 58.1 kg (128 lb)   SpO2 100%   BMI 20.05 kg/m       Constitutional:  The patient was unaccompanied, well-groomed, and in no acute distress.     Neurologic: Alert and oriented x 3.  CN's III-XII within normal limits.  Voice normal.   Psychiatric: The patient's affect was calm, cooperative, and appropriate.     Communication:  Normal; communicates verbally, normal voice quality.    Respiratory: Breathing comfortably without stridor or exertion of accessory muscles.    Ears: Pinnae and tragus non-tender.  EAC's and TM's were clear.      Audiogram: 1/9/2023 - data independently reviewed  Right ear: Moderate rising to mild sloping to moderate sensorineural hearing loss (stable compared to 11/8/22)  Left ear: normal hearing    WR right: 48% at 85 dB left: 100% at 50 dB  Acoustic Reflexes: present in all conditions  Tympanograms: type A bilaterally     Assessment and Plan:  1. Vertiginous migraine  Patient with persistent episodes of probable " vestibular migraines. Evaluated by PT who reported no definite clinical findings of any peripheral vestibular deficit. Recommend follow up with neurology for headache management. Referral placed to Neurology today.    2. Asymmetrical sensorineural hearing loss  Hearing on right side is stable since November 2022. No change with oral steroid burst. Patient is now interested in pursuing a hearing aid for the right ear. Patient is medically cleared for right sided hearing aid. Referral placed to audiology for hearing aid consult.      Patient will follow up in 6 months with repeat audiogram. Sooner for any sudden changes in hearing.    Alena Simmons DNP, APRN, CNP  Otolaryngology  Head & Neck Surgery  [phone]    20 minutes spent on the date of the encounter doing chart review, history and exam, documentation and further activities per the note

## 2023-01-09 NOTE — PATIENT INSTRUCTIONS
- You were seen in the ENT Clinic today by Alena Simmons NP.   - Schedule with Neurology for vertiginous migraine management.   - Schedule hearing aid consult for right hearing loss.  - Please send a [facility] message or call the ENT clinic at [phone] with any questions or concerns.

## 2023-01-09 NOTE — PROGRESS NOTES
AUDIOLOGY REPORT    SUMMARY: Audiology visit completed. See audiogram for results.      RECOMMENDATIONS: Follow-up with ENT.      Bashir Jang, CCC-A, [facility]  Licensed Audiologist  [place] #[id number]

## 2023-01-16 ENCOUNTER — THERAPY VISIT (OUTPATIENT)
Dept: PHYSICAL THERAPY | Facility: CLINIC | Age: 23
End: 2023-01-16
Payer: COMMERCIAL

## 2023-01-16 DIAGNOSIS — R42 VERTIGO: Primary | ICD-10-CM

## 2023-01-16 PROCEDURE — 97750 PHYSICAL PERFORMANCE TEST: CPT | Mod: GP | Performed by: PHYSICAL THERAPIST

## 2023-01-16 PROCEDURE — 97535 SELF CARE MNGMENT TRAINING: CPT | Mod: GP | Performed by: PHYSICAL THERAPIST

## 2023-01-16 NOTE — DISCHARGE INSTRUCTIONS
[facility] Neurology Clinic- see if they have any sooner appointments. Keep the appointment here in May too  [phone]  Try yoga and meditation for symptom management  Continue hydration and low impact aerobic program 20 minutes a day    Myra Joshua PT, DPT  Physical Therapist  [facility] [address]  [address] [place] [address]  [email or web address]  Appointments: [phone]       Diagnostic Criteria for Vestibular Migraine as defined by the [employer] Society    Vestibular Migraine  A. At least 5 episodes with vestibular symptoms of moderate to severe intensity, lasting 5 minutes to 72 hours  B. Current or previous history of migraine with or with out aura according to the International Classification of Headache Disorders (ICHD)  C. One or more migraine features with at least 50% of the vestibular episodes        - headaches with at least 2 of the following characteristics: one sided location, pulsating quality, moderate or severe pain intensity, aggravation by routine physical activity        - photophobia and phonophobia        - visual aura  D. Not better accounted for by another vestibular or ICHD diagnosis    Probable Vestibular Migraine  A. At least 5 episodes with vestibular symptoms of moderate to severe intensity, lasting 5 minutes to 72 hours  B. Only one of the criteria B and C for vestibular migraine is fulfilled (migraine history OR migraine features during the episode)  D. Not better accounted for by another vestibular or ICHD diagnosis     Migraine Elimination Diet: What to Eat, What to Avoid  Comprehensive Migraine Elimination Diet - Migraine Association    Headache Elimination Diet  Clinical Guide from the [facility]

## 2023-01-16 NOTE — PROGRESS NOTES
[facility]    Outpatient Physical Therapy Discharge Note  Patient: Janeth Parra  : 2000    Beginning/End Dates of Reporting Period:  2022 to 2023    Referring Provider: Julia Simmons NP    Therapy Diagnosis: Dizziness     Client Self Report: Dizziness episodes are becoming more frequent (4 times a week). Headache referral placed- can't get in until May, wanting to go somewhere else. Trying to walk, drink more water    Objective Measurements:  Objective Measure: FGA  Details:       Goals:  Goal Identifier DHI   Goal Description Patient will score less than 15 on the DHI to reduce dizzy symptoms during functional activities   Target Date 02/15/23   Date Met      Progress (detail required for progress note): : Did not reassess due to patient's report of increased frequency of dizzinss     Goal Identifier FGA   Goal Description Patient will perform the FGA and score 30/30 to reduce fall risk   Target Date 02/15/23   Date Met      Progress (detail required for progress note): : Improved score from 27/30 to 28/30     Goal Identifier DVA   Goal Description Patient will score less than 3 line loss on the DVA to improve gaze stability for symptom management   Target Date 02/15/23   Date Met  23   Progress (detail required for progress note):       Goal Identifier HEP   Goal Description Patient will be independent home exercise program for maintenance of therapy gains at discharge   Target Date 02/15/23   Date Met  23   Progress (detail required for progress note):         Plan:  Discharge from therapy.    Discharge:    Reason for Discharge: Neurology headache referral placed due to probability of vestibular migraines.  No skilled PT services warranted at this time, but may be beneficial after medical management    Equipment Issued: N/A    Discharge Plan: Patient to  continue home program.

## 2023-01-17 DIAGNOSIS — R09.81 NASAL CONGESTION: Primary | ICD-10-CM

## 2023-03-12 NOTE — TELEPHONE ENCOUNTER
RECORDS RECEIVED FROM:    REASON FOR VISIT: Migraines    Date of Appt: 5/31/2023   NOTES (FOR ALL VISITS) STATUS DETAILS   OFFICE NOTE from referring provider Kevin Simmons-1/9/2023   OFFICE NOTE from other specialist     DISCHARGE SUMMARY from hospital     DISCHARGE REPORT from the ER     OPERATIVE REPORT     [facility] (MS ONLY)     EMG     EEG     MEDICATION LIST     IMAGING  (FOR ALL VISITS)     LUMBAR PUNCTURE     APRIL SCAN     ULTRASOUND (CAROTID BILAT) *VASCULAR*     MRI (HEAD, NECK, SPINE) No Images     CT (HEAD, NECK, SPINE) No Images

## 2023-04-30 ENCOUNTER — HEALTH MAINTENANCE LETTER (OUTPATIENT)
Age: 23
End: 2023-04-30

## 2023-05-04 ENCOUNTER — OFFICE VISIT (OUTPATIENT)
Dept: OBGYN | Facility: CLINIC | Age: 23
End: 2023-05-04
Attending: NURSE PRACTITIONER
Payer: COMMERCIAL

## 2023-05-04 VITALS
DIASTOLIC BLOOD PRESSURE: 81 MMHG | BODY MASS INDEX: 20.25 KG/M2 | SYSTOLIC BLOOD PRESSURE: 125 MMHG | HEIGHT: 67 IN | HEART RATE: 80 BPM | WEIGHT: 129 LBS

## 2023-05-04 DIAGNOSIS — N90.810 FEMALE GENITAL MUTILATION: Primary | ICD-10-CM

## 2023-05-04 PROCEDURE — 99203 OFFICE O/P NEW LOW 30 MIN: CPT | Performed by: NURSE PRACTITIONER

## 2023-05-04 PROCEDURE — G0463 HOSPITAL OUTPT CLINIC VISIT: HCPCS | Performed by: NURSE PRACTITIONER

## 2023-05-04 ASSESSMENT — PAIN SCALES - GENERAL: PAINLEVEL: NO PAIN (0)

## 2023-05-04 NOTE — PROGRESS NOTES
"Subjective:  Janeth Parra is a 23 yr old female, , who presents to clinic today to discuss deinfibulation.     Janeth reports bothersome symptoms of:  - Urine flow disrupted; sometimes hurts to urinate  - Pain with menses - both pelvic and at the the FGM site.  Uses pads and notices blood goes \"all over the place.\"  These symptoms seem to have gotten worse as she had gotten older.     She has never been sexually active. Desires to be proactive in surgery to increase comfort in the future.      Never had any UTIs.   Never had a pap smear    Periods are regular, monthly - bleeding lasts ~5 days; first two days are heavy and then it tapers off.    Not using any medications to manage menses. Does yoga and drinks water which helps.     No current medications  Allergies: none    Her sister recently had this procedure done through [facility] and her sister has been very happy she did it.    No past medical history on file.   No past surgical history on file.    Family History   Problem Relation Age of Onset     Constipation Sister      Coronary Artery Disease No family hx of      Diabetes No family hx of      Hypertension No family hx of      Hyperlipidemia No family hx of      Cerebrovascular Disease No family hx of      Colon Cancer No family hx of      Breast Cancer No family hx of        Objective:  /81   Pulse 80   Ht 1.702 m (5' 7\")   Wt 58.5 kg (129 lb)   LMP 2023   BMI 20.20 kg/m    General: pleasant female in no acute distress  Psych: normal mentation, well oriented  Respiratory:  Unlabored breathing  Musculoskeletal: no gross deformities  Pelvic Exam:  EG: Type 3 FGM present; No external lesions, normal hair distribution, no adenopathy  Vagina: narrowed vaginal opening;     Assessment:  Encounter Diagnosis   Name Primary?     Female genital mutilation Yes     Plan:  Janeth desires deinfibulation procedure. Counseled on option for it to be done in clinic vs the OR. She desires to have " anesthesia for procedure and have it performed in the OR. Briefly reviewed how the procedure is performed and post-op care.   Surgical procedure request sent to Obgyn. Recommended pt schedule appointment with the ObGyn surgeon that will perform the surgery.   Pt also requests to have pap smear done at the time of procedure.    Pt expressed understanding and agreement with the plan for care.  Susy Borja, DNP, APRN, WHNP

## 2023-05-04 NOTE — LETTER
"2023       RE: Janeth Parra  [address]   [facility] [address]     Dear Colleague,    Thank you for referring your patient, Janeth Parra, to the [facility] WOMEN'S CLINIC [place] at [facility]. Please see a copy of my visit note below.    Subjective:  Janeth Parra is a 23 yr old female, , who presents to clinic today to discuss deinfibulation.     Janeth reports bothersome symptoms of:  - Urine flow disrupted; sometimes hurts to urinate  - Pain with menses - both pelvic and at the the FGM site.  Uses pads and notices blood goes \"all over the place.\"  These symptoms seem to have gotten worse as she had gotten older.     She has never been sexually active. Desires to be proactive in surgery to increase comfort in the future.      Never had any UTIs.   Never had a pap smear    Periods are regular, monthly - bleeding lasts ~5 days; first two days are heavy and then it tapers off.    Not using any medications to manage menses. Does yoga and drinks water which helps.     No current medications  Allergies: none    Her sister recently had this procedure done through [facility] and her sister has been very happy she did it.    No past medical history on file.   No past surgical history on file.    Family History   Problem Relation Age of Onset    Constipation Sister     Coronary Artery Disease No family hx of     Diabetes No family hx of     Hypertension No family hx of     Hyperlipidemia No family hx of     Cerebrovascular Disease No family hx of     Colon Cancer No family hx of     Breast Cancer No family hx of        Objective:  /81   Pulse 80   Ht 1.702 m (5' 7\")   Wt 58.5 kg (129 lb)   LMP 2023   BMI 20.20 kg/m    General: pleasant female in no acute distress  Psych: normal mentation, well oriented  Respiratory:  Unlabored breathing  Musculoskeletal: no gross deformities  Pelvic Exam:  EG: Type 3 FGM present; No " external lesions, normal hair distribution, no adenopathy  Vagina: narrowed vaginal opening;     Assessment:  Encounter Diagnosis   Name Primary?    Female genital mutilation Yes     Plan:  Janeth desires deinfibulation procedure. Counseled on option for it to be done in clinic vs the OR. She desires to have anesthesia for procedure and have it performed in the OR. Briefly reviewed how the procedure is performed and post-op care.   Surgical procedure request sent to Obgyn. Recommended pt schedule appointment with the ObGyn surgeon that will perform the surgery.   Pt also requests to have pap smear done at the time of procedure.    Pt expressed understanding and agreement with the plan for care.  Susy Borja, DNP, APRN, WHNP

## 2023-05-09 ENCOUNTER — PREP FOR PROCEDURE (OUTPATIENT)
Dept: OBGYN | Facility: CLINIC | Age: 23
End: 2023-05-09
Payer: MEDICAID

## 2023-05-09 ENCOUNTER — TELEPHONE (OUTPATIENT)
Dept: OBGYN | Facility: CLINIC | Age: 23
End: 2023-05-09
Payer: MEDICAID

## 2023-05-19 ENCOUNTER — OFFICE VISIT (OUTPATIENT)
Dept: FAMILY MEDICINE | Facility: CLINIC | Age: 23
End: 2023-05-19
Payer: COMMERCIAL

## 2023-05-19 VITALS
HEIGHT: 67 IN | WEIGHT: 126.5 LBS | HEART RATE: 75 BPM | OXYGEN SATURATION: 100 % | BODY MASS INDEX: 19.85 KG/M2 | TEMPERATURE: 97.5 F | SYSTOLIC BLOOD PRESSURE: 120 MMHG | RESPIRATION RATE: 16 BRPM | DIASTOLIC BLOOD PRESSURE: 72 MMHG

## 2023-05-19 DIAGNOSIS — R10.13 ABDOMINAL PAIN, EPIGASTRIC: Primary | ICD-10-CM

## 2023-05-19 PROCEDURE — 99213 OFFICE O/P EST LOW 20 MIN: CPT | Performed by: FAMILY MEDICINE

## 2023-05-19 ASSESSMENT — PAIN SCALES - GENERAL: PAINLEVEL: SEVERE PAIN (6)

## 2023-05-19 NOTE — PROGRESS NOTES
"  Assessment & Plan     Abdominal pain, epigastric  - Helicobacter pylori Antigen Stool; Future  - omeprazole (PRILOSEC) 20 MG DR capsule; Take 1 capsule (20 mg) by mouth 2 times daily for 14 days  - Helicobacter pylori Antigen Stool    [facility]    Deepa Fowler is a 23 year old, presenting for the following health issues:  Abdominal Pain        5/19/2023     3:15 PM   Additional Questions   Roomed by Kaleigh Rascon   Accompanied by N/A     History of Present Illness       Reason for visit:  Abdonen pain and stomach pain    She eats 2-3 servings of fruits and vegetables daily.She consumes 1 sweetened beverage(s) daily.She exercises with enough effort to increase her heart rate 20 to 29 minutes per day.  She exercises with enough effort to increase her heart rate 4 days per week.   She is taking medications regularly.      Has been treated 3 times for h pylori    Epigastric abdominal pain for 2 weeks  Hard to sleep   Hurts if she doesn't eat   Hurts if she eats before bed.      No blood in stool  No black stool   No recent constipation        Objective    /72 (BP Location: Left arm, Patient Position: Sitting, Cuff Size: Adult Regular)   Pulse 75   Temp 97.5  F (36.4  C) (Temporal)   Resp 16   Ht 1.697 m (5' 6.81\")   Wt 57.4 kg (126 lb 8 oz)   LMP 05/06/2023 (Exact Date)   SpO2 100%   BMI 19.92 kg/m    Body mass index is 19.92 kg/m .     Physical Exam  Constitutional:       General: She is not in acute distress.  Eyes:      General: No scleral icterus.  Pulmonary:      Effort: No respiratory distress.   Neurological:      Mental Status: She is alert.   Psychiatric:         Mood and Affect: Mood normal.         Behavior: Behavior normal.                      "

## 2023-05-21 PROCEDURE — 87338 HPYLORI STOOL AG IA: CPT | Performed by: FAMILY MEDICINE

## 2023-05-22 ENCOUNTER — TELEPHONE (OUTPATIENT)
Dept: OBGYN | Facility: CLINIC | Age: 23
End: 2023-05-22
Payer: MEDICAID

## 2023-05-22 NOTE — TELEPHONE ENCOUNTER
Sent MyChart msg for PT to schedule Consult with Dr Cavazos.      Get updated phone number for Patient if there is one.

## 2023-05-23 DIAGNOSIS — A04.8 H. PYLORI INFECTION: Primary | ICD-10-CM

## 2023-05-23 LAB — H PYLORI AG STL QL IA: POSITIVE

## 2023-05-23 NOTE — PROGRESS NOTES
[facility]    HPI: Janeth Parra is a 23 year old G0 who presents to clinic today to discuss deinfibulation from FGM. She is bothered by disruption of urine flow and flow of menstrual blood. She has never been sexually active but is worried about pain with future penetrative intercourse. Her sister recently had a deinfibulation with [facility] and was happy with her decision. She has some questions about what her anatomy will look like after the procedure.     Denies family history of problems with anesthesia. No family history of bleeding or clotting disorders.     OB History: G0    Gyn History:   Menses: monthly lasting 5 days  Pap smear history: never done     PMH negative    PSH: none    Medications: Omeprazole     Allergies: none    Objective:   /86   Pulse 77   Wt 58.5 kg (129 lb)   LMP 05/06/2023 (Exact Date)   BMI 20.32 kg/m    General: Healthy appearing. Alert, oriented. Affect is appropriate.   Heart: Regular rate and rhythm  Lungs: Lungs clear to auscultation bilaterally   Abdomen: Soft, nontender, without masses or hernias.   Skin: Warm and dry without malignant appearing lesions.   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 Infibulation of labia majora extending over urethra and clitoris. Firm area palpated at area of clitoris suspected to be remaining clitoris, but not able to visualize. Normal perineum and anus. Anatomy shown to patient with mirror and graphics of normal anatomy shown to patient.     Assessment/Plan:   Janeth Parra is a 23 year old G0 with FGM desiring de-infibulation. Reviewed surgical procedure, recovery, and anatomy before and after. Surgical risks of infection, unsatisfactory cosmetic appearance, scarring, blood loss, injury to nearby organs reviewed. She is low-risk for surgery and medically cleared for the proposed procedure.     - preop orders placed for deinfibulation and pap smear  - patient requests no male residents/students  be involved in her case    Marjorie Cavazos MD

## 2023-05-24 DIAGNOSIS — A04.8 H. PYLORI INFECTION: Primary | ICD-10-CM

## 2023-05-30 ENCOUNTER — OFFICE VISIT (OUTPATIENT)
Dept: OBGYN | Facility: CLINIC | Age: 23
End: 2023-05-30
Attending: NURSE PRACTITIONER
Payer: COMMERCIAL

## 2023-05-30 VITALS
HEART RATE: 77 BPM | SYSTOLIC BLOOD PRESSURE: 128 MMHG | BODY MASS INDEX: 20.32 KG/M2 | DIASTOLIC BLOOD PRESSURE: 86 MMHG | WEIGHT: 129 LBS

## 2023-05-30 DIAGNOSIS — N90.813 FEMALE GENITAL MUTILATION TYPE III STATUS: Primary | ICD-10-CM

## 2023-05-30 PROCEDURE — G0463 HOSPITAL OUTPT CLINIC VISIT: HCPCS | Performed by: STUDENT IN AN ORGANIZED HEALTH CARE EDUCATION/TRAINING PROGRAM

## 2023-05-30 PROCEDURE — 99213 OFFICE O/P EST LOW 20 MIN: CPT | Performed by: STUDENT IN AN ORGANIZED HEALTH CARE EDUCATION/TRAINING PROGRAM

## 2023-05-30 ASSESSMENT — PAIN SCALES - GENERAL: PAINLEVEL: NO PAIN (0)

## 2023-05-30 NOTE — PATIENT INSTRUCTIONS
Thank you for trusting us with your care!     If you need to contact us for questions about:  Symptoms, Scheduling & Medical Questions; Non-urgent (2-3 day response) Kelli message, Urgent (needing response today) [phone] (if after 3:30pm next day response)   Prescriptions: Please call your Pharmacy   Billing: [facility] [phone] or [facility]:[phone]

## 2023-05-30 NOTE — LETTER
5/30/2023       RE: Janeth Parra  [address]   [place] [address]     Dear Colleague,    Thank you for referring your patient, Janeth Parra, to the [facility]. Please see a copy of my visit note below.    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 She is bothered by disruption of urine flow and flow of menstrual blood. She has never been sexually active but is worried about pain with future penetrative intercourse. Her sister recently had a deinfibulation with [facility] and was happy with her decision. She has some questions about what her anatomy will look like after the procedure.     Denies family history of problems with anesthesia. No family history of bleeding or clotting disorders.     OB History: G0    Gyn History:   Menses: monthly lasting 5 days  Pap smear history: never done     PMH negative    PSH: none    Medications: Omeprazole     Allergies: none    Objective:   /86   Pulse 77   Wt 58.5 kg (129 lb)   LMP 05/06/2023 (Exact Date)   BMI 20.32 kg/m    General: Healthy appearing. Alert, oriented. Affect is appropriate.   Heart: Regular rate and rhythm  Lungs: Lungs clear to auscultation bilaterally   Abdomen: Soft, nontender, without masses or hernias.   Skin: Warm and dry without malignant appearing lesions.   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 Infibulation of labia majora extending over urethra and clitoris. Firm area palpated at area of clitoris suspected to be remaining clitoris, but not able to visualize. Normal perineum and anus. Anatomy shown to patient with mirror and graphics of normal anatomy shown to patient.     Assessment/Plan:   Janeth Parra is a 23 year old G0 with FGM desiring de-infibulation. Reviewed surgical procedure, recovery, and  anatomy before and after. Surgical risks of infection, unsatisfactory cosmetic appearance, scarring, blood loss, injury to nearby organs reviewed. She is low-risk for surgery and medically cleared for the proposed procedure.     - preop orders placed for deinfibulation and pap smear  - patient requests no male residents/students be involved in her case    Marjorie Cavazos MD

## 2023-05-31 ENCOUNTER — PRE VISIT (OUTPATIENT)
Dept: NEUROLOGY | Facility: CLINIC | Age: 23
End: 2023-05-31

## 2023-05-31 ENCOUNTER — VIRTUAL VISIT (OUTPATIENT)
Dept: NEUROLOGY | Facility: CLINIC | Age: 23
End: 2023-05-31
Attending: REGISTERED NURSE
Payer: COMMERCIAL

## 2023-05-31 DIAGNOSIS — G43.109 VERTIGINOUS MIGRAINE: ICD-10-CM

## 2023-05-31 DIAGNOSIS — G43.719 INTRACTABLE CHRONIC MIGRAINE WITHOUT AURA AND WITHOUT STATUS MIGRAINOSUS: Primary | ICD-10-CM

## 2023-05-31 PROBLEM — N90.813 FEMALE GENITAL MUTILATION TYPE III STATUS: Status: ACTIVE | Noted: 2023-05-31

## 2023-05-31 PROCEDURE — 99203 OFFICE O/P NEW LOW 30 MIN: CPT | Mod: VID | Performed by: NURSE PRACTITIONER

## 2023-05-31 RX ORDER — RIZATRIPTAN BENZOATE 5 MG/1
5-10 TABLET, ORALLY DISINTEGRATING ORAL
Qty: 18 TABLET | Refills: 6 | Status: SHIPPED | OUTPATIENT
Start: 2023-05-31 | End: 2023-07-11

## 2023-05-31 RX ORDER — AMITRIPTYLINE HYDROCHLORIDE 10 MG/1
TABLET ORAL
Qty: 60 TABLET | Refills: 5 | Status: SHIPPED | OUTPATIENT
Start: 2023-05-31 | End: 2023-07-10

## 2023-05-31 RX ORDER — ONDANSETRON 4 MG/1
4 TABLET, ORALLY DISINTEGRATING ORAL EVERY 8 HOURS PRN
COMMUNITY
End: 2023-07-11

## 2023-05-31 RX ORDER — ACETAMINOPHEN 325 MG/1
975 TABLET ORAL ONCE
Status: CANCELLED | OUTPATIENT
Start: 2023-05-31 | End: 2023-05-31

## 2023-05-31 ASSESSMENT — MIGRAINE DISABILITY ASSESSMENT (MIDAS)
TOTAL SCORE: 26
HOW MANY DAYS DID YOU NOT DO HOUSEWORK BECAUSE OF HEADACHES: 5
HOW OFTEN WERE SOCIAL ACTIVITIES MISSED DUE TO HEADACHES: 3
HOW MANY DAYS WAS HOUSEWORK PRODUCTIVITY CUT IN HALF DUE TO HEADACHES: 7
ON A SCALE FROM 0-10 ON AVERAGE HOW PAINFUL WERE HEADACHES: 8
HOW MANY DAYS DID YOU MISS WORK OR SCHOOL BECAUSE OF HEADACHES: 6
HOW MANY DAYS IN THE PAST 3 MONTHS HAVE YOU HAD A HEADACHE: 10
HOW MANY DAYS WAS YOUR PRODUCTIVITY CUT IN HALF BECAUSE OF HEADACHES: 5

## 2023-05-31 ASSESSMENT — HEADACHE IMPACT TEST (HIT 6)
WHEN YOU HAVE HEADACHES HOW OFTEN IS THE PAIN SEVERE: VERY OFTEN
HOW OFTEN DID HEADACHS LIMIT CONCENTRATION ON WORK OR DAILY ACTIVITY: SOMETIMES
HOW OFTEN HAVE YOU FELT FED UP OR IRRITATED BECAUSE OF YOUR HEADACHES: SOMETIMES
HOW OFTEN HAVE YOU FELT TOO TIRED TO WORK BECAUSE OF YOUR HEADACHES: SOMETIMES
WHEN YOU HAVE A HEADACHE HOW OFTEN DO YOU WISH YOU COULD LIE DOWN: VERY OFTEN
HOW OFTEN DO HEADACHES LIMIT YOUR DAILY ACTIVITIES: VERY OFTEN
HIT6 TOTAL SCORE: 63

## 2023-05-31 NOTE — LETTER
5/31/2023       RE: Janeth Parra  [address]   [place] [address]     Dear Colleague,    Thank you for referring your patient, Janeth Parra, to the [facility] NEUROLOGY CLINIC [place] at [facility]. Please see a copy of my visit note below.    Janeth is a 23 year old who is being evaluated via a billable video visit.      ASSESSMENT AND PLAN:  Headaches and headache symptoms appear to be migrainous. Mother suffers of migraine headaches-possible genetic in etiology.   Right ear ringing chronic since 2022  Brain MRI August-Sep 2022 presumed normal-no images available from [facility] in [facility] today in PACS. Would need to obtain them.     Plan discussed to optimize migraine prevention and rescue treatment  A trial of amitriptyline 10 mg at bedtime 1-2 weeks and may try to increase the dose to 20 mg at bedtime if tolerated. Side effects-dryness, constipation, drowsiness  Rescue treatment -a trial rizatriptan as needed at severe migraine onset. Limit use to no more than 9 days per month.   Side effects-fatigue, drowsiness, dizziness, nausea   Ondansetron as needed for nausea   Follow up in 3-4 months or sooner if needed     Subjective   Janeth is a 23 year old, presenting for the following health issues:  Video Visit (Migraines )    HPI   Headaches about a year ago since starting right ear ringing. Went to the ED 1/6/2022 and was told has a swimmers ears. Worsen in Ringing in the ears and headaches became more severe +spinning room since June 2022.   ED note reviewed [facility] Emergency/Urgent care.   Headaches daily in the past 3 months but not as bad as before -Fall of 2022 /Winter 2023.   Loc -right sided and throbbing and pain level from moderate to intense/severe. Severe throbbing at least 2/week and duration -hours   Associated with light and noise sensitivity and ringing is louder, nausea and room spinning   Visual  "symptoms -\"like upside down\" and eyes hurting and ringing louder    Mother has migraines     Brain MRI [facility]    Headache Tx   Advil -does not help     PMH:  H pylori and on omeprozole       Objective    Vitals - Patient Reported  Weight (Patient Reported): 58.5 kg (129 lb)  Height (Patient Reported): 170.2 cm (5' 7\")  BMI (Based on Pt Reported Ht/Wt): 20.2  Pain Score: Severe Pain (6)  Pain Loc: Head      Physical Exam   Patient IS alert and no in apparent acute distress,  mentation appears normal, judgement and insight intact, normal speech.      I discussed all my recommendations with Janeth Parra who verbalizes understanding and comfortable with the plan.      31 minutes spent on the date of the encounter doing video access, chart  review, meds review, treatment plan, documentation and further activities as noted above    OBDULIA Francisco, CNP [facility]  Headache certified  [facility]    Video-Visit Details    Type of service:  Video Visit   Originating Location (pt. Location): Home  Distant Location (provider location):  On-site  Platform used for Video Visit: Karan    "

## 2023-05-31 NOTE — PATIENT INSTRUCTIONS
Plan discussed to optimize migraine prevention and rescue treatment  A trial of amitriptyline 10 mg at bedtime 1-2 weeks and may try to increase the dose to 20 mg at bedtime if tolerated. Side effects-dryness, constipation, drowsiness  Rescue treatment -a trial rizatriptan as needed at severe migraine onset. Limit use to no more than 9 days per month.   Side effects-fatigue, drowsiness, dizziness, nausea   Ondansetron as needed for nausea   Follow up in 3-4 months or sooner if needed

## 2023-05-31 NOTE — PROGRESS NOTES
"Janeth is a 23 year old who is being evaluated via a billable video visit.      ASSESSMENT AND PLAN:  Headaches and headache symptoms appear to be migrainous. Mother suffers of migraine headaches-possible genetic in etiology.   Right ear ringing chronic since 2022  Brain MRI August-Sep 2022 presumed normal-no images available from [facility] in [facility] today in PACS. Would need to obtain them.     Plan discussed to optimize migraine prevention and rescue treatment  A trial of amitriptyline 10 mg at bedtime 1-2 weeks and may try to increase the dose to 20 mg at bedtime if tolerated. Side effects-dryness, constipation, drowsiness  Rescue treatment -a trial rizatriptan as needed at severe migraine onset. Limit use to no more than 9 days per month.   Side effects-fatigue, drowsiness, dizziness, nausea   Ondansetron as needed for nausea   Follow up in 3-4 months or sooner if needed     Subjective   Janeth is a 23 year old, presenting for the following health issues:  Video Visit (Migraines )    HPI   Headaches about a year ago since starting right ear ringing. Went to the ED 1/6/2022 and was told has a swimmers ears. Worsen in Ringing in the ears and headaches became more severe +spinning room since June 2022.   ED note reviewed [facility]Urgent care.   Headaches daily in the past 3 months but not as bad as before -Fall of 2022 /Winter 2023.   Loc -right sided and throbbing and pain level from moderate to intense/severe. Severe throbbing at least 2/week and duration -hours   Associated with light and noise sensitivity and ringing is louder, nausea and room spinning   Visual symptoms -\"like upside down\" and eyes hurting and ringing louder    Mother has migraines     Brain MRI [facility]    Headache Tx   Advil -does not help     PMH:  H pylori and on omeprozole       Objective    Vitals - Patient Reported  Weight (Patient Reported): 58.5 kg (129 lb)  Height (Patient Reported): " "170.2 cm (5' 7\")  BMI (Based on Pt Reported Ht/Wt): 20.2  Pain Score: Severe Pain (6)  Pain Loc: Head      Physical Exam   Patient IS alert and no in apparent acute distress,  mentation appears normal, judgement and insight intact, normal speech.      I discussed all my recommendations with Janeth Parra who verbalizes understanding and comfortable with the plan.      31 minutes spent on the date of the encounter doing video access, chart  review, meds review, treatment plan, documentation and further activities as noted above    OBDULIA Francisco, CNP [facility]  Headache certified  [facility]    Video-Visit Details    Type of service:  Video Visit   Originating Location (pt. Location): Home  Distant Location (provider location):  On-site  Platform used for Video Visit: Karan    "

## 2023-06-01 ENCOUNTER — TELEPHONE (OUTPATIENT)
Dept: NEUROLOGY | Facility: CLINIC | Age: 23
End: 2023-06-01
Payer: MEDICAID

## 2023-06-01 ENCOUNTER — TELEPHONE (OUTPATIENT)
Dept: FAMILY MEDICINE | Facility: CLINIC | Age: 23
End: 2023-06-01
Payer: MEDICAID

## 2023-06-01 NOTE — TELEPHONE ENCOUNTER
MTM referral from: [facility] visit (referral by provider)    MTM referral outreach attempt #2 on June 1, 2023 at 12:45 PM      Outcome: Patient not reachable after several attempts, will route to MT Pharmacist/Provider as an FYI.  [facility] scheduling number is [phone].  Thank you for the referral.    Use yuliet wesley for the carrier/Plan on the flowsheet    DONTAE Ward

## 2023-06-01 NOTE — TELEPHONE ENCOUNTER
Contacted [facility] [place] [phone]. Spoke to Makayla and requested images be pushed to [facility].  Per Makayla pt only had MRI brain on 9/6/22 and she will push these images. Requested a copy of report be faxed to us ATTN: Jo Ann.

## 2023-06-01 NOTE — TELEPHONE ENCOUNTER
----- Message from OBDULIA Holland CNP sent at 5/31/2023  3:08 PM CDT -----  Regarding: Missing Images brain MRI  Please push images thru from [facility] from August -September 2022. None available in PACS. Thank you

## 2023-06-05 ENCOUNTER — TELEPHONE (OUTPATIENT)
Dept: NEUROLOGY | Facility: CLINIC | Age: 23
End: 2023-06-05
Payer: MEDICAID

## 2023-06-05 NOTE — TELEPHONE ENCOUNTER
Left Voicemail (1st Attempt) and Sent Mychart (1st Attempt) for the patient to call back and schedule the following:    Appointment type: follow up  Provider:Bala Cherry CNP  Return date: around 3 months  Specialty phone number: [phone]  Additional appointment(s) needed: N/A  Additonal Notes: LVM with  help and sent a MyC message.  Christina Salcedo on 6/5/2023 at 9:43 AM

## 2023-06-07 ENCOUNTER — TELEPHONE (OUTPATIENT)
Dept: NEUROLOGY | Facility: CLINIC | Age: 23
End: 2023-06-07
Payer: MEDICAID

## 2023-06-07 NOTE — TELEPHONE ENCOUNTER
Left Voicemail (2nd Attempt) for the patient to call back and schedule the following:    Appointment type: follow up  Provider: Bala Cherry  Return date: 3 months  Specialty phone number: [phone]  Additional appointment(s) needed: None  Additonal Notes: LVM x2, sent  MyC   Christina Salcedo on 6/7/2023 at 12:09 PM

## 2023-07-03 DIAGNOSIS — H90.3 ASYMMETRICAL SENSORINEURAL HEARING LOSS: Primary | ICD-10-CM

## 2023-07-10 ENCOUNTER — OFFICE VISIT (OUTPATIENT)
Dept: AUDIOLOGY | Facility: CLINIC | Age: 23
End: 2023-07-10
Payer: COMMERCIAL

## 2023-07-10 ENCOUNTER — OFFICE VISIT (OUTPATIENT)
Dept: OTOLARYNGOLOGY | Facility: CLINIC | Age: 23
End: 2023-07-10
Payer: COMMERCIAL

## 2023-07-10 ENCOUNTER — HOSPITAL ENCOUNTER (OUTPATIENT)
Facility: AMBULATORY SURGERY CENTER | Age: 23
End: 2023-07-10
Attending: STUDENT IN AN ORGANIZED HEALTH CARE EDUCATION/TRAINING PROGRAM | Admitting: STUDENT IN AN ORGANIZED HEALTH CARE EDUCATION/TRAINING PROGRAM
Payer: COMMERCIAL

## 2023-07-10 VITALS
WEIGHT: 129 LBS | TEMPERATURE: 97.2 F | DIASTOLIC BLOOD PRESSURE: 72 MMHG | HEIGHT: 66 IN | OXYGEN SATURATION: 100 % | BODY MASS INDEX: 20.73 KG/M2 | HEART RATE: 70 BPM | SYSTOLIC BLOOD PRESSURE: 120 MMHG

## 2023-07-10 DIAGNOSIS — H90.41 SENSORINEURAL HEARING LOSS (SNHL) OF RIGHT EAR WITH UNRESTRICTED HEARING OF LEFT EAR: Primary | ICD-10-CM

## 2023-07-10 DIAGNOSIS — H90.3 ASYMMETRICAL SENSORINEURAL HEARING LOSS: Primary | ICD-10-CM

## 2023-07-10 DIAGNOSIS — H93.13 TINNITUS, BILATERAL: ICD-10-CM

## 2023-07-10 DIAGNOSIS — H93.13 TINNITUS OF BOTH EARS: ICD-10-CM

## 2023-07-10 PROCEDURE — 99213 OFFICE O/P EST LOW 20 MIN: CPT | Performed by: REGISTERED NURSE

## 2023-07-10 PROCEDURE — 92565 STENGER TEST PURE TONE: CPT | Performed by: AUDIOLOGIST-HEARING AID FITTER

## 2023-07-10 PROCEDURE — 92557 COMPREHENSIVE HEARING TEST: CPT | Performed by: AUDIOLOGIST-HEARING AID FITTER

## 2023-07-10 PROCEDURE — 92550 TYMPANOMETRY & REFLEX THRESH: CPT | Performed by: AUDIOLOGIST-HEARING AID FITTER

## 2023-07-10 ASSESSMENT — PAIN SCALES - GENERAL: PAINLEVEL: SEVERE PAIN (7)

## 2023-07-10 NOTE — PROGRESS NOTES
Otolaryngology Clinic  July 10, 2023    HPI:  Janeth Parra is here for a recheck of their right hearing and tinnitus.  Patient has a history of right asymmetric sensorineural hearing loss.  MRI was negative.  Patient also has a history of severe migraine headaches and follows with neurology.  Since our last visit, patient reports a significant improvement in the right hearing that occurred a couple of months ago.  Continues to have right tinnitus that is worse when patient is having headaches.  Patient was recently seen in the emergency department due to right tinnitus and migraine.  Also has dizziness and nausea with headaches.  Patient is scheduled to follow-up with neurology this week.  Patient denies any otalgia, otorrhea, facial numbness/weakness, history of frequent ear infections, or ear surgeries.     Otologic exam:    Right ear was examined.  Normal appearing TM, nicely aerated middle ear space.     Left ear was examined.  Normal appearing TM, nicely aerated middle ear space.    Audiogram: 7/10/2023 - data independently reviewed  Right ear: Normal hearing  Left ear: Mild sensorineural hearing loss (15 to 30 dB improvement across most frequencies compared to previous audiogram)  WR right: 100% at 70 dB (significant improvement compared to 48% word recognition score on 1/9/2023) left: 100% at 50 dB   Acoustic Reflexes: present in all conditions  Tympanograms: type A bilaterally    Assessment and Plan:  1. Asymmetrical sensorineural hearing loss  Patient with history of asymmetric sensorineural hearing loss right worse than left.  Audiogram today does demonstrate a significant improvement in the right-sided hearing with a return of an excellent word recognition score on the right side.  Occurs to happen spontaneously over the past few months.  Recommend that patient continue to monitor hearing and should return to clinic for a repeat hearing test and assessment if patient notices any new sudden changes  in right sided hearing or worsening of right tinnitus without associated headache.    2. Bilateral tinnitus  Patient with bilateral tinnitus with right worse than left.  Severe tinnitus symptoms seem to be associated with migraine headaches.  Explained to patient that, although tinnitus may be triggered by hearing loss, it is a central process which means it is a brain generated noise.  Suspect that patient's tinnitus symptoms are due to headache disorder.  Recommend patient continue to follow with neurology for headache management.     Patient will follow up in 1 year with audiogram.  Sooner for any new, sudden changes in hearing.    Alena Simmons DNP, APRN, CNP  Otolaryngology  Head & Neck Surgery  [phone]    20 minutes spent on the date of the encounter doing chart review, history and exam, documentation and further activities per the note

## 2023-07-10 NOTE — LETTER
7/10/2023       RE: Janeth Parra  [address]   [place] [address]     Dear Colleague,    Thank you for referring your patient, Janeth Parra, to the [facility] EAR NOSE AND THROAT [facility]INIC [place] at [facility]. Please see a copy of my visit note below.      Otolaryngology Clinic  July 10, 2023    HPI:  Janeth Parra is here for a recheck of their right hearing and tinnitus.  Patient has a history of right asymmetric sensorineural hearing loss.  MRI was negative.  Patient also has a history of severe migraine headaches and follows with neurology.  Since our last visit, patient reports a significant improvement in the right hearing that occurred a couple of months ago.  Continues to have right tinnitus that is worse when patient is having headaches.  Patient was recently seen in the emergency department due to right tinnitus and migraine.  Also has dizziness and nausea with headaches.  Patient is scheduled to follow-up with neurology this week.  Patient denies any otalgia, otorrhea, facial numbness/weakness, history of frequent ear infections, or ear surgeries.     Otologic exam:    Right ear was examined.  Normal appearing TM, nicely aerated middle ear space.     Left ear was examined.  Normal appearing TM, nicely aerated middle ear space.    Audiogram: 7/10/2023 - data independently reviewed  Right ear: Normal hearing  Left ear: Mild sensorineural hearing loss (15 to 30 dB improvement across most frequencies compared to previous audiogram)  WR right: 100% at 70 dB (significant improvement compared to 48% word recognition score on 1/9/2023) left: 100% at 50 dB   Acoustic Reflexes: present in all conditions  Tympanograms: type A bilaterally    Assessment and Plan:  1. Asymmetrical sensorineural hearing loss  Patient with history of asymmetric sensorineural hearing loss right worse than left.  Audiogram today does demonstrate a  significant improvement in the right-sided hearing with a return of an excellent word recognition score on the right side.  Occurs to happen spontaneously over the past few months.  Recommend that patient continue to monitor hearing and should return to clinic for a repeat hearing test and assessment if patient notices any new sudden changes in right sided hearing or worsening of right tinnitus without associated headache.    2. Bilateral tinnitus  Patient with bilateral tinnitus with right worse than left.  Severe tinnitus symptoms seem to be associated with migraine headaches.  Explained to patient that, although tinnitus may be triggered by hearing loss, it is a central process which means it is a brain generated noise.  Suspect that patient's tinnitus symptoms are due to headache disorder.  Recommend patient continue to follow with neurology for headache management.     Patient will follow up in 1 year with audiogram.  Sooner for any new, sudden changes in hearing.    Alena Simmons DNP, APRN, CNP  Otolaryngology  Head & Neck Surgery  [phone]    20 minutes spent on the date of the encounter doing chart review, history and exam, documentation and further activities per the note

## 2023-07-10 NOTE — NURSING NOTE
"Chief Complaint   Patient presents with     RECHECK     6 month follow up with audio     Blood pressure 120/72, pulse 70, temperature 97.2  F (36.2  C), height 1.676 m (5' 6\"), weight 58.5 kg (129 lb), last menstrual period 05/06/2023, SpO2 100 %, not currently breastfeeding.    Jose A Decker LPN    "

## 2023-07-11 ENCOUNTER — OFFICE VISIT (OUTPATIENT)
Dept: NEUROLOGY | Facility: CLINIC | Age: 23
End: 2023-07-11
Payer: COMMERCIAL

## 2023-07-11 VITALS
WEIGHT: 130.3 LBS | DIASTOLIC BLOOD PRESSURE: 73 MMHG | OXYGEN SATURATION: 100 % | HEART RATE: 61 BPM | BODY MASS INDEX: 21.03 KG/M2 | SYSTOLIC BLOOD PRESSURE: 116 MMHG

## 2023-07-11 DIAGNOSIS — K59.01 SLOW TRANSIT CONSTIPATION: Primary | ICD-10-CM

## 2023-07-11 DIAGNOSIS — G43.719 INTRACTABLE CHRONIC MIGRAINE WITHOUT AURA AND WITHOUT STATUS MIGRAINOSUS: ICD-10-CM

## 2023-07-11 PROCEDURE — 99214 OFFICE O/P EST MOD 30 MIN: CPT | Performed by: NURSE PRACTITIONER

## 2023-07-11 RX ORDER — METOCLOPRAMIDE 5 MG/1
5-10 TABLET ORAL EVERY 6 HOURS PRN
Qty: 20 TABLET | Refills: 3 | Status: SHIPPED | OUTPATIENT
Start: 2023-07-11 | End: 2023-08-08

## 2023-07-11 RX ORDER — DIPHENHYDRAMINE HCL 25 MG
25-50 CAPSULE ORAL EVERY 6 HOURS PRN
Qty: 30 CAPSULE | Refills: 3 | Status: SHIPPED | OUTPATIENT
Start: 2023-07-11 | End: 2023-08-08

## 2023-07-11 RX ORDER — SUMATRIPTAN 50 MG/1
50-100 TABLET, FILM COATED ORAL
Qty: 12 TABLET | Refills: 9 | Status: SHIPPED | OUTPATIENT
Start: 2023-07-11 | End: 2023-10-24

## 2023-07-11 ASSESSMENT — PAIN SCALES - GENERAL: PAINLEVEL: SEVERE PAIN (6)

## 2023-07-11 NOTE — LETTER
7/11/2023       RE: Janeth Parra  [address]   [place] [address]     Dear Colleague,    Thank you for referring your patient, Janeth Parra, to the [facility] NEUROLOGY CLINIC [place] at [facility]. Please see a copy of my visit note below.      Subjective   Janeth is a 23 year old, presenting for the following health issues:  RECHECK (Return headache)  Initial Headache Clinic visit 5/31/2023, see note for details  Took amitriptyline 10 mg at bedtime and no side effects.   Severe migraine July 4th and prompted ED evaluation -note reviewed. Reviewed criteria for ED visits and goal headache prevention  Patient took rizatriptan 5 mg am and did not take it again   Takes rizatriptan daily and was not aware its for rescue. Discussed migraine rescue and prevention.   Patient has seen Dr Lewis at [facility] on June 1st for a second opinion. Was started on topiramate and was on it for about 2 weeks as well.   Discussed she needs to stay with one Headache Clinic -unsafe to have a different providers to prescribe different medications for migraine.   Appetite and weight stable but problems in the past.     Plan:  Retry of amitriptyline 25 mg at bedtime -side effects -constipation, dryness, may increase appetite   Rescue treatment -a trial of sumatriptan as needed for a severe migraine onset. May repeat in 2 hours as needed. Limit use to no more than 9 days per month.   Metoclopramide +benedryl as needed for migraine and nausea   Follow up in 3 months or sooner if needed   Call [phone] or [facility] with any questions       Objective    /73 (BP Location: Right arm, Patient Position: Sitting, Cuff Size: Adult Regular)   Pulse 61   Wt 59.1 kg (130 lb 4.8 oz)   LMP 05/06/2023 (Exact Date)   SpO2 100%   BMI 21.03 kg/m    Body mass index is 21.03 kg/m .  Physical Exam   GENERAL: healthy, alert and no distress  EYES: Eyes  grossly normal to inspection, MS: no gross musculoskeletal defects noted, no edema  NEURO: Normal strength and tone, mentation intact and speech normal  PSYCH: mentation appears normal, affect normal    I discussed all my recommendations with Janeth Parra who verbalizes understanding and comfortable with the plan.    31 minutes spent on the date of the encounter doing face to face access, chart  review,   meds review, treatment plan, documentation and further activities as noted above        Again, thank you for allowing me to participate in the care of your patient.      Sincerely,    OBDULIA Holland CNP

## 2023-07-11 NOTE — PROGRESS NOTES
Subjective   Janeth is a 23 year old, presenting for the following health issues:  RECHECK (Return headache)  Initial Headache Clinic visit 5/31/2023, see note for details  Took amitriptyline 10 mg at bedtime and no side effects.   Severe migraine July 4th and prompted ED evaluation -note reviewed. Reviewed criteria for ED visits and goal headache prevention  Patient took rizatriptan 5 mg am and did not take it again   Takes rizatriptan daily and was not aware its for rescue. Discussed migraine rescue and prevention.   Patient has seen Dr Lewis at [facility] on June 1st for a second opinion. Was started on topiramate and was on it for about 2 weeks as well.   Discussed she needs to stay with one Headache Clinic -unsafe to have a different providers to prescribe different medications for migraine.   Appetite and weight stable but problems in the past.     Plan:  Retry of amitriptyline 25 mg at bedtime -side effects -constipation, dryness, may increase appetite   Rescue treatment -a trial of sumatriptan as needed for a severe migraine onset. May repeat in 2 hours as needed. Limit use to no more than 9 days per month.   Metoclopramide +benedryl as needed for migraine and nausea   Follow up in 3 months or sooner if needed   Call [phone] or [facility] with any questions       Objective    /73 (BP Location: Right arm, Patient Position: Sitting, Cuff Size: Adult Regular)   Pulse 61   Wt 59.1 kg (130 lb 4.8 oz)   LMP 05/06/2023 (Exact Date)   SpO2 100%   BMI 21.03 kg/m    Body mass index is 21.03 kg/m .  Physical Exam   GENERAL: healthy, alert and no distress  EYES: Eyes grossly normal to inspection, MS: no gross musculoskeletal defects noted, no edema  NEURO: Normal strength and tone, mentation intact and speech normal  PSYCH: mentation appears normal, affect normal    I discussed all my recommendations with Janeth Parra who verbalizes understanding and comfortable with the plan.    31  minutes spent on the date of the encounter doing face to face access, chart  review,   meds review, treatment plan, documentation and further activities as noted above    OBDULIA Francisco, CNP [facility]  Headache certified  [facility]

## 2023-07-11 NOTE — PATIENT INSTRUCTIONS
Plan:  Retry of amitriptyline 25 mg at bedtime -side effects -constipation, dryness, may increase appetite   Rescue treatment -a trial of sumatriptan as needed for a severe migraine onset. May repeat in 2 hours as needed. Limit use to no more than 9 days per month.   Metoclopramide +benedryl as needed for migraine and nausea   Follow up in 3 months or sooner if needed   Call [phone] or [facility] with any questions

## 2023-08-03 ENCOUNTER — TELEPHONE (OUTPATIENT)
Dept: OBGYN | Facility: CLINIC | Age: 23
End: 2023-08-03
Payer: MEDICAID

## 2023-08-03 NOTE — TELEPHONE ENCOUNTER
FUTURE VISIT INFORMATION      SURGERY INFORMATION:  Date: 8/9/23  [facility]: [facility]  Surgeon:  Marjorie Cavazos MD   Anesthesia Type:  MAC  Procedure: REVERSAL, FEMALE GENITAL MUTILATION AND PAP SMEAR     RECORDS REQUESTED FROM:       Primary Care Provider: [employer]

## 2023-08-03 NOTE — TELEPHONE ENCOUNTER
Called Janeth and gave her the numbers for [facility] to call and schedule pre-op appointment. Encouraged her to call them now.

## 2023-08-08 ENCOUNTER — ANESTHESIA EVENT (OUTPATIENT)
Dept: SURGERY | Facility: AMBULATORY SURGERY CENTER | Age: 23
End: 2023-08-08

## 2023-08-08 ENCOUNTER — OFFICE VISIT (OUTPATIENT)
Dept: SURGERY | Facility: CLINIC | Age: 23
End: 2023-08-08
Payer: COMMERCIAL

## 2023-08-08 ENCOUNTER — PRE VISIT (OUTPATIENT)
Dept: SURGERY | Facility: CLINIC | Age: 23
End: 2023-08-08

## 2023-08-08 VITALS
OXYGEN SATURATION: 100 % | RESPIRATION RATE: 16 BRPM | TEMPERATURE: 97.5 F | DIASTOLIC BLOOD PRESSURE: 80 MMHG | HEART RATE: 94 BPM | BODY MASS INDEX: 21.24 KG/M2 | WEIGHT: 132.2 LBS | HEIGHT: 66 IN | SYSTOLIC BLOOD PRESSURE: 120 MMHG

## 2023-08-08 DIAGNOSIS — Z01.818 PREOP EXAMINATION: Primary | ICD-10-CM

## 2023-08-08 DIAGNOSIS — N90.813 FEMALE GENITAL MUTILATION TYPE III STATUS: ICD-10-CM

## 2023-08-08 PROCEDURE — 99202 OFFICE O/P NEW SF 15 MIN: CPT

## 2023-08-08 ASSESSMENT — ENCOUNTER SYMPTOMS: SEIZURES: 0

## 2023-08-08 ASSESSMENT — LIFESTYLE VARIABLES: TOBACCO_USE: 0

## 2023-08-08 ASSESSMENT — PAIN SCALES - GENERAL: PAINLEVEL: NO PAIN (0)

## 2023-08-08 NOTE — H&P (VIEW-ONLY)
Pre-Operative H & P     CC:  Preoperative exam to assess for increased cardiopulmonary risk while undergoing surgery and anesthesia.    Date of Encounter: 8/8/2023  Primary Care Physician:  Renetta Samayoa     Reason for visit:   Encounter Diagnoses   Name Primary?    Preop examination Yes    Female genital mutilation type III status        HPI  Janeth Parra is a 23 year old female who presents for pre-operative H & P in preparation for  Procedure Information       Case: [id number] Date/Time: 08/09/23 0815    Procedure: REVERSAL, FEMALE GENITAL MUTILATION AND PAP SMEAR (Vulva)    Anesthesia type: MAC    Diagnosis: Female genital mutilation type III status [N90.813]    Pre-op diagnosis: Female genital mutilation type III status [N90.813]    [facility]: [place] [address] / [facility]    Providers: Marjorie Cavazos MD            Janeth Parra  is a 22 y/o female with a PMH significant for migraines, environmental allergies, with history of female genital mutilation desiring de-infibulation. She is bothered by disruption of urine flow and flow of menstrual blood. She was subsequently referred to Dr. Cavazos on 5/30/23 and the above surgery was recommended for further management.     History is obtained from the patient and chart review    Hx of abnormal bleeding or anti-platelet use: None.     Menstrual history: Patient's last menstrual period was 07/24/2023 (exact date).:     Past Medical History  Past Medical History:   Diagnosis Date    Migraine        Past Surgical History  History reviewed. No pertinent surgical history.    Prior to Admission Medications  Current Outpatient Medications   Medication Sig Dispense Refill    amitriptyline (ELAVIL) 25 MG tablet Take 1 tablet (25 mg) by mouth At Bedtime 30 tablet 5    SUMAtriptan (IMITREX) 50 MG tablet Take 1-2 tablets ( mg) by mouth at onset of headache for migraine May repeat in 2 hours. Max 4 tablets/24  hours. 12 tablet 9       Allergies  No Known Allergies    Social History  Social History     Socioeconomic History    Marital status: Single     Spouse name: Not on file    Number of children: Not on file    Years of education: Not on file    Highest education level: Not on file   Occupational History    Not on file   Tobacco Use    Smoking status: Never    Smokeless tobacco: Never   Vaping Use    Vaping Use: Never used   Substance and Sexual Activity    Alcohol use: Never    Drug use: Never    Sexual activity: Never   Other Topics Concern    Not on file   Social History Narrative    Lives in [facility] with family     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      Social Determinants of Health     Financial Resource Strain: Not on file   Food Insecurity: Not on file   Transportation Needs: Not on file   Physical Activity: Not on file   Stress: Not on file   Social Connections: Not on file   Intimate Partner Violence: Not on file   Housing Stability: Not on file       Family History  Family History   Problem Relation Age of Onset    Constipation Sister     Coronary Artery Disease No family hx of     Diabetes No family hx of     Hypertension No family hx of     Hyperlipidemia No family hx of     Cerebrovascular Disease No family hx of     Colon Cancer No family hx of     Breast Cancer No family hx of     Anesthesia Reaction No family hx of     Venous thrombosis No family hx of        Review of Systems  The complete review of systems is negative other than noted in the HPI or here.   Anesthesia Evaluation   Pt has not had prior anesthetic         ROS/MED HX  ENT/Pulmonary:     (+)           allergic rhinitis,                         (-) tobacco use, asthma, sleep apnea, HUMBERTO risk factors and recent URI   Neurologic:     (+)      migraines,                       (-) no seizures and no CVA   Cardiovascular:     (+)  - -   -  - -                                 No previous cardiac testing  (-) hypertension and  "dyslipidemia   METS/Exercise Tolerance:  Comment: - Biking and playing basketball without exertional symptoms    Hematologic:  - neg hematologic  ROS  (-) history of blood clots, anemia and history of blood transfusion   Musculoskeletal:  - neg musculoskeletal ROS     GI/Hepatic:     (+) GERD (rare, only with spicy foods),                   Renal/Genitourinary: Comment: - Female genital mutilation type III status      Endo:  - neg endo ROS  (-) Type II DM   Psychiatric/Substance Use:  - neg psychiatric ROS  (-) psychiatric history   Infectious Disease:  - neg infectious disease ROS  (-) Recent Fever   Malignancy:  - neg malignancy ROS  (-) malignancy   Other:            /80 (BP Location: Right arm, Patient Position: Sitting, Cuff Size: Adult Regular)   Pulse 94   Temp 97.5  F (36.4  C) (Oral)   Resp 16   Ht 1.676 m (5' 6\")   Wt 60 kg (132 lb 3.2 oz)   LMP 07/24/2023 (Exact Date)   SpO2 100%   Breastfeeding No   BMI 21.34 kg/m      Physical Exam   Constitutional: Awake, alert, cooperative, no apparent distress, and appears stated age.  Eyes: Pupils equal, round and reactive to light, extra ocular muscles intact, sclera clear, conjunctiva normal.  HENT: Normocephalic, oral pharynx with moist mucus membranes, good dentition. No goiter appreciated.   Respiratory: Clear to auscultation bilaterally, no crackles or wheezing.  Cardiovascular: Regular rate and rhythm, normal S1 and S2, and no murmur noted.  Carotids +2, no bruits. No edema. Palpable pulses to radial  DP and PT arteries.   GI: Normal bowel sounds, soft, non-distended, non-tender, no masses palpated, no hepatosplenomegaly.    Lymph/Hematologic: No cervical lymphadenopathy and no supraclavicular lymphadenopathy.  Genitourinary:  Deferred.   Skin: Warm and dry.    Musculoskeletal: Full ROM of neck. There is no redness, warmth, or swelling of the joints. Gross motor strength is normal.    Neurologic: Awake, alert, oriented to name, place and time. " Cranial nerves II-XII are grossly intact. Gait is normal.   Neuropsychiatric: Calm, cooperative. Normal affect.     Prior Labs/Diagnostic Studies   All labs and imaging personally reviewed     EKG/ stress test - if available please see in ROS above     The patient's records and results personally reviewed by this provider.     Outside records reviewed from: Care Everywhere    LAB/DIAGNOSTIC STUDIES TODAY:      Ref Range & Units 1 mo ago    SODIUM 136 - 145 mmol/L 142   POTASSIUM 3.4 - 5.1 mmol/L 3.7   Comment: Interpret with caution, specimen slightly hemolyzed. Results may be affected   CHLORIDE 98 - 108 mmol/L 107   CARBON DIOXIDE 20 - 31 mmol/L 26   BUN (UREA NITRO) 9 - 23 mg/dL 8 Low    CREATININE 0.50 - 1.00 mg/dL 0.73   EST GFR (CKD-EPI) >60.00 mL/min/1.73m2 >60.00   Comment: Calculation based on the Chronic Kidney Disease Epidemiology Collaboration (CKD-EPI) equation refit without adjustment for race.   GLUCOSE 74 - 106 mg/dL 109 High    CALCIUM, SERUM 8.7 - 10.4 mg/dL 9.8   ANION GAP 0.0 - 15.0 mmol/L 9.0   Resulting Agency  [facility]     Specimen Collected: 07/04/23  5:43 PM    Performed by: [facility] Last Resulted: 07/04/23  6:18 PM   Received From: [facility]  Result Received: 07/05/23  8:52 PM       Ref Range & Units 1 mo ago    WBC 4.3 - 10.8 K/uL 5.9   RBC 4.20 - 5.40 M/uL 4.91   HEMOGLOBIN 12.0 - 16.0 gm/dL 14.3   HEMATOCRIT 36.0 - 48.0 % 42.5   MCV 80 - 100 fL 87   MCH 27 - 33 pg 29   MCHC 33 - 36 gm/dL 34   RDW 11.5 - 14.5 % 13.1   PLATELET COUNT 150 - 400 K/   MPV 6.5 - 12 fL 9.3   PMN % % 52.6   IG% <=1.0 % 0.2   LYMPH % % 36.4   MONO % % 8.6   EOS % % 1.9   BASO % % 0.3   PMN ABSOLUTE 1.80 - 7.80 K/uL 3.12   IG ABSOLUTE 0.00 - 0.00 K/uL 0.01 High    LYMPH ABSOLUTE 1.00 - 4.00 K/uL 2.16   MONO ABSOLUTE 0.00 - 1.00 K/uL 0.51   EOS ABSOLUTE 0.00 - 0.45 K/uL 0.11   BASO ABSOLUTE 0.00 - 0.20 K/uL 0.02   NUCL RBC % 0.0 - 0.0 /100 WBC 0.0  "  NUCL RBC ABSOLUTE 0.00 - 0.00 K/uL 0.00   Resulting Agency  [facility]     Specimen Collected: 07/04/23  5:43 PM    Performed by: [facility] Last Resulted: 07/04/23  6:10 PM   Received From: [facility]  Result Received: 07/05/23  8:52 PM     Assessment    Janeth Parra is a 23 year old female seen as a PAC referral for risk assessment and optimization for anesthesia.    Plan/Recommendations  Pt will be optimized for the proposed procedure.  See below for details on the assessment, risk, and preoperative recommendations    NEUROLOGY  - No history of TIA, CVA or seizure  - Migraines. Follows with neurology and managed on amitriptyline and sumatriptan.   Hold sumatriptan on DOS.   -Post Op delirium risk factors:  No risk identified    ENT  - No current airway concerns.  Will need to be reassessed day of surgery.  Mallampati: II  TM: > 3    CARDIAC  - No history of CAD, Hypertension, and Afib  - METS (Metabolic Equivalents)  Patient performs 4 or more METS exercise without symptoms            Total Score: 0      RCRI-Very low risk: Class 1 0.4% complication rate            Total Score: 0        PULMONARY  HUMBERTO Low Risk            Total Score: 0      - Denies asthma or inhaler use  - Tobacco History    History   Smoking Status    Never   Smokeless Tobacco    Never       GI  - Rare GERD symptoms with spicy foods only. Not taking any medications.   PONV High Risk  Total Score: 3           1 AN PONV: Pt is Female    1 AN PONV: Patient is not a current smoker    1 AN PONV: Intended Post Op Opioids        /RENAL  - Baseline Creatinine  0.73  - Female genital mutilation type III status (see HPI) - surgery planned as above.     ENDOCRINE    - BMI: Estimated body mass index is 21.34 kg/m  as calculated from the following:    Height as of this encounter: 1.676 m (5' 6\").    Weight as of this encounter: 60 kg (132 lb 3.2 oz).  Healthy Weight (BMI 18.5-24.9)  - No " history of Diabetes Mellitus    HEME  VTE Low Risk 0.26%            Total Score: 0      - No history of abnormal bleeding or antiplatelet use.      Different anesthesia methods/types have been discussed with the patient, but they are aware that the final plan will be decided by the assigned anesthesia provider on the date of service.      The patient is optimized for their procedure. AVS with information on surgery time/arrival time, meds and NPO status given by nursing staff. No further diagnostic testing indicated.      On the day of service:     Prep time: 3 minutes  Visit time: 8 minutes  Documentation time: 8 minutes  ------------------------------------------  Total time: 19 minutes      OBDULIA Conn CNP  Preoperative Assessment Center  [facility]  Clinic and Surgery Center  Phone: [phone]  Fax: [phone]

## 2023-08-08 NOTE — H&P
Pre-Operative H & P     CC:  Preoperative exam to assess for increased cardiopulmonary risk while undergoing surgery and anesthesia.    Date of Encounter: 8/8/2023  Primary Care Physician:  Renetta Samayoa     Reason for visit:   Encounter Diagnoses   Name Primary?    Preop examination Yes    Female genital mutilation type III status        HPI  Janeth Parra is a 23 year old female who presents for pre-operative H & P in preparation for  Procedure Information       Case: [id number] Date/Time: 08/09/23 0815    Procedure: REVERSAL, FEMALE GENITAL MUTILATION AND PAP SMEAR (Vulva)    Anesthesia type: MAC    Diagnosis: Female genital mutilation type III status [N90.813]    Pre-op diagnosis: Female genital mutilation type III status [N90.813]    [facility]: [place] [address] [facility]    Providers: Mrajorie Cavazos MD            Janeth Parra  is a 22 y/o female with a PMH significant for migraines, environmental allergies, with history of female genital mutilation desiring de-infibulation. She is bothered by disruption of urine flow and flow of menstrual blood. She was subsequently referred to Dr. Cavazos on 5/30/23 and the above surgery was recommended for further management.     History is obtained from the patient and chart review    Hx of abnormal bleeding or anti-platelet use: None.     Menstrual history: Patient's last menstrual period was 07/24/2023 (exact date).:     Past Medical History  Past Medical History:   Diagnosis Date    Migraine        Past Surgical History  History reviewed. No pertinent surgical history.    Prior to Admission Medications  Current Outpatient Medications   Medication Sig Dispense Refill    amitriptyline (ELAVIL) 25 MG tablet Take 1 tablet (25 mg) by mouth At Bedtime 30 tablet 5    SUMAtriptan (IMITREX) 50 MG tablet Take 1-2 tablets ( mg) by mouth at onset of headache for migraine May repeat in 2 hours. Max 4 tablets/24  hours. 12 tablet 9       Allergies  No Known Allergies    Social History  Social History     Socioeconomic History    Marital status: Single     Spouse name: Not on file    Number of children: Not on file    Years of education: Not on file    Highest education level: Not on file   Occupational History    Not on file   Tobacco Use    Smoking status: Never    Smokeless tobacco: Never   Vaping Use    Vaping Use: Never used   Substance and Sexual Activity    Alcohol use: Never    Drug use: Never    Sexual activity: Never   Other Topics Concern    Not on file   Social History Narrative    Lives in [facility] with family     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      Social Determinants of Health     Financial Resource Strain: Not on file   Food Insecurity: Not on file   Transportation Needs: Not on file   Physical Activity: Not on file   Stress: Not on file   Social Connections: Not on file   Intimate Partner Violence: Not on file   Housing Stability: Not on file       Family History  Family History   Problem Relation Age of Onset    Constipation Sister     Coronary Artery Disease No family hx of     Diabetes No family hx of     Hypertension No family hx of     Hyperlipidemia No family hx of     Cerebrovascular Disease No family hx of     Colon Cancer No family hx of     Breast Cancer No family hx of     Anesthesia Reaction No family hx of     Venous thrombosis No family hx of        Review of Systems  The complete review of systems is negative other than noted in the HPI or here.   Anesthesia Evaluation   Pt has not had prior anesthetic         ROS/MED HX  ENT/Pulmonary:     (+)           allergic rhinitis,                         (-) tobacco use, asthma, sleep apnea, HUMBERTO risk factors and recent URI   Neurologic:     (+)      migraines,                       (-) no seizures and no CVA   Cardiovascular:     (+)  - -   -  - -                                 No previous cardiac testing  (-) hypertension and  "dyslipidemia   METS/Exercise Tolerance:  Comment: - Biking and playing basketball without exertional symptoms    Hematologic:  - neg hematologic  ROS  (-) history of blood clots, anemia and history of blood transfusion   Musculoskeletal:  - neg musculoskeletal ROS     GI/Hepatic:     (+) GERD (rare, only with spicy foods),                   Renal/Genitourinary: Comment: - Female genital mutilation type III status      Endo:  - neg endo ROS  (-) Type II DM   Psychiatric/Substance Use:  - neg psychiatric ROS  (-) psychiatric history   Infectious Disease:  - neg infectious disease ROS  (-) Recent Fever   Malignancy:  - neg malignancy ROS  (-) malignancy   Other:            /80 (BP Location: Right arm, Patient Position: Sitting, Cuff Size: Adult Regular)   Pulse 94   Temp 97.5  F (36.4  C) (Oral)   Resp 16   Ht 1.676 m (5' 6\")   Wt 60 kg (132 lb 3.2 oz)   LMP 07/24/2023 (Exact Date)   SpO2 100%   Breastfeeding No   BMI 21.34 kg/m      Physical Exam   Constitutional: Awake, alert, cooperative, no apparent distress, and appears stated age.  Eyes: Pupils equal, round and reactive to light, extra ocular muscles intact, sclera clear, conjunctiva normal.  HENT: Normocephalic, oral pharynx with moist mucus membranes, good dentition. No goiter appreciated.   Respiratory: Clear to auscultation bilaterally, no crackles or wheezing.  Cardiovascular: Regular rate and rhythm, normal S1 and S2, and no murmur noted.  Carotids +2, no bruits. No edema. Palpable pulses to radial  DP and PT arteries.   GI: Normal bowel sounds, soft, non-distended, non-tender, no masses palpated, no hepatosplenomegaly.    Lymph/Hematologic: No cervical lymphadenopathy and no supraclavicular lymphadenopathy.  Genitourinary:  Deferred.   Skin: Warm and dry.    Musculoskeletal: Full ROM of neck. There is no redness, warmth, or swelling of the joints. Gross motor strength is normal.    Neurologic: Awake, alert, oriented to name, place and time. " Cranial nerves II-XII are grossly intact. Gait is normal.   Neuropsychiatric: Calm, cooperative. Normal affect.     Prior Labs/Diagnostic Studies   All labs and imaging personally reviewed     EKG/ stress test - if available please see in ROS above     The patient's records and results personally reviewed by this provider.     Outside records reviewed from: Care Everywhere    LAB/DIAGNOSTIC STUDIES TODAY:      Ref Range & Units 1 mo ago    SODIUM 136 - 145 mmol/L 142   POTASSIUM 3.4 - 5.1 mmol/L 3.7   Comment: Interpret with caution, specimen slightly hemolyzed. Results may be affected   CHLORIDE 98 - 108 mmol/L 107   CARBON DIOXIDE 20 - 31 mmol/L 26   BUN (UREA NITRO) 9 - 23 mg/dL 8 Low    CREATININE 0.50 - 1.00 mg/dL 0.73   EST GFR (CKD-EPI) >60.00 mL/min/1.73m2 >60.00   Comment: Calculation based on the Chronic Kidney Disease Epidemiology Collaboration (CKD-EPI) equation refit without adjustment for race.   GLUCOSE 74 - 106 mg/dL 109 High    CALCIUM, SERUM 8.7 - 10.4 mg/dL 9.8   ANION GAP 0.0 - 15.0 mmol/L 9.0   Resulting Agency  [facility]     Specimen Collected: 07/04/23  5:43 PM    Performed by: [facility] Last Resulted: 07/04/23  6:18 PM   Received From: [facility]  Result Received: 07/05/23  8:52 PM       Ref Range & Units 1 mo ago    WBC 4.3 - 10.8 K/uL 5.9   RBC 4.20 - 5.40 M/uL 4.91   HEMOGLOBIN 12.0 - 16.0 gm/dL 14.3   HEMATOCRIT 36.0 - 48.0 % 42.5   MCV 80 - 100 fL 87   MCH 27 - 33 pg 29   MCHC 33 - 36 gm/dL 34   RDW 11.5 - 14.5 % 13.1   PLATELET COUNT 150 - 400 K/   MPV 6.5 - 12 fL 9.3   PMN % % 52.6   IG% <=1.0 % 0.2   LYMPH % % 36.4   MONO % % 8.6   EOS % % 1.9   BASO % % 0.3   PMN ABSOLUTE 1.80 - 7.80 K/uL 3.12   IG ABSOLUTE 0.00 - 0.00 K/uL 0.01 High    LYMPH ABSOLUTE 1.00 - 4.00 K/uL 2.16   MONO ABSOLUTE 0.00 - 1.00 K/uL 0.51   EOS ABSOLUTE 0.00 - 0.45 K/uL 0.11   BASO ABSOLUTE 0.00 - 0.20 K/uL 0.02   NUCL RBC % 0.0 - 0.0 /100 WBC 0.0  "  NUCL RBC ABSOLUTE 0.00 - 0.00 K/uL 0.00   Resulting Agency  [facility]     Specimen Collected: 07/04/23  5:43 PM    Performed by: [facility] Last Resulted: 07/04/23  6:10 PM   Received From: [facility]  Result Received: 07/05/23  8:52 PM     Assessment    Janeth Parra is a 23 year old female seen as a PAC referral for risk assessment and optimization for anesthesia.    Plan/Recommendations  Pt will be optimized for the proposed procedure.  See below for details on the assessment, risk, and preoperative recommendations    NEUROLOGY  - No history of TIA, CVA or seizure  - Migraines. Follows with neurology and managed on amitriptyline and sumatriptan.   Hold sumatriptan on DOS.   -Post Op delirium risk factors:  No risk identified    ENT  - No current airway concerns.  Will need to be reassessed day of surgery.  Mallampati: II  TM: > 3    CARDIAC  - No history of CAD, Hypertension, and Afib  - METS (Metabolic Equivalents)  Patient performs 4 or more METS exercise without symptoms            Total Score: 0      RCRI-Very low risk: Class 1 0.4% complication rate            Total Score: 0        PULMONARY  HUMBERTO Low Risk            Total Score: 0      - Denies asthma or inhaler use  - Tobacco History    History   Smoking Status    Never   Smokeless Tobacco    Never       GI  - Rare GERD symptoms with spicy foods only. Not taking any medications.   PONV High Risk  Total Score: 3           1 AN PONV: Pt is Female    1 AN PONV: Patient is not a current smoker    1 AN PONV: Intended Post Op Opioids        /RENAL  - Baseline Creatinine  0.73  - Female genital mutilation type III status (see HPI) - surgery planned as above.     ENDOCRINE    - BMI: Estimated body mass index is 21.34 kg/m  as calculated from the following:    Height as of this encounter: 1.676 m (5' 6\").    Weight as of this encounter: 60 kg (132 lb 3.2 oz).  Healthy Weight (BMI 18.5-24.9)  - No " history of Diabetes Mellitus    HEME  VTE Low Risk 0.26%            Total Score: 0      - No history of abnormal bleeding or antiplatelet use.      Different anesthesia methods/types have been discussed with the patient, but they are aware that the final plan will be decided by the assigned anesthesia provider on the date of service.      The patient is optimized for their procedure. AVS with information on surgery time/arrival time, meds and NPO status given by nursing staff. No further diagnostic testing indicated.      On the day of service:     Prep time: 3 minutes  Visit time: 8 minutes  Documentation time: 8 minutes  ------------------------------------------  Total time: 19 minutes      OBDULIA Conn CNP  Preoperative Assessment Center  [facility]  Clinic and Surgery Center  Phone: [phone]  Fax: [phone]

## 2023-08-08 NOTE — PATIENT INSTRUCTIONS
Preparing for Your Surgery      Name:  Janeth Parra   MRN:  [id number]   :  2000   Today's Date:  2023         Arriving for surgery:  Surgery date:  23  Arrival time:  6.45AM    Restrictions due to COVID 19:    Please maintain social distance.  Masking is optional.      parking is available for anyone with mobility limitations or disabilities. (Monday- Friday 7 am- 5 pm)    Please come to:    [facility]  [address] [place] [id number]    Please check in on the 5th floor at the Ambulatory Surgery Center.      What can I eat or drink?    -  You may eat and drink normally until 8 hours prior to arrival  time. (Until 10.45PM)  -  You may have clear liquids until 2 hours prior to arrival  time. (Until 4.45AM)    Examples of clear liquids:  Water  Clear broth  Juices (apple, white grape, white cranberry  and cider) without pulp  Noncarbonated, powder based beverages  (lemonade and Syed-Aid)  Sodas (Sprite, 7-Up, ginger ale and seltzer)  Coffee or tea (without milk or cream)  Gatorade      Which medicines can I take?    Hold Aspirin for 7 days before surgery.   Hold Multivitamins for 7 days before surgery.  Hold Supplements for 7 days before surgery.  Hold Ibuprofen (Advil, Motrin) for 1 day before surgery--unless otherwise directed by surgeon.  Hold Naproxen (Aleve) for 4 days before surgery.  Take your bedtime or evening medications per usual    No alcohol or cannabis products for 24 hours prior to procedure.      -  DO NOT take the following medications the day of surgery:  Sumatriptan (Imitrex).    -  PLEASE TAKE the following medications the day of surgery:   None.    How do I prepare myself?  - Please take 2 showers (one the night prior to surgery and one the morning of surgery) using Scrubcare or Hibiclens soap.    Use this soap only from the neck to your toes.     Leave the soap on your skin for one minute--then rinse thoroughly.      You may use your  own shampoo and conditioner. No other hair products.   - Please remove all jewelry and body piercings.  - No lotions, deodorants or fragrance.  - No makeup or fingernail polish.   - Bring your ID and insurance card.    -If you have a Deep Brain Stimulator, a Spinal Cord Stimulator, or any implanted Neuro Device, you must bring the remote to the Surgery Center.         ALL PATIENTS ARE REQUIRED TO HAVE A RESPONSIBLE ADULT TO DRIVE AND BE IN ATTENDANCE WITH THEM FOR 24 HOURS FOLLOWING SURGERY.     Covid testing policy as of 12/06/2022  Your surgeon will notify and schedule you for a COVID test if one is needed before surgery--please direct any questions or COVID symptoms to your surgeon      Questions or Concerns:    -For questions regarding the day of surgery, please contact the Ambulatory Surgery Center at [phone].    -If you have health changes between today and your surgery, please contact your surgeon.     - For questions after surgery, please contact your surgeon's office.

## 2023-08-09 ENCOUNTER — HOSPITAL ENCOUNTER (OUTPATIENT)
Facility: AMBULATORY SURGERY CENTER | Age: 23
Discharge: HOME | End: 2023-08-09
Attending: STUDENT IN AN ORGANIZED HEALTH CARE EDUCATION/TRAINING PROGRAM | Admitting: STUDENT IN AN ORGANIZED HEALTH CARE EDUCATION/TRAINING PROGRAM
Payer: COMMERCIAL

## 2023-08-09 ENCOUNTER — ANESTHESIA (OUTPATIENT)
Dept: ANESTHESIOLOGY | Facility: AMBULATORY SURGERY CENTER | Age: 23
End: 2023-08-09

## 2023-08-09 VITALS
OXYGEN SATURATION: 100 % | HEART RATE: 95 BPM | DIASTOLIC BLOOD PRESSURE: 71 MMHG | WEIGHT: 130 LBS | HEIGHT: 66 IN | TEMPERATURE: 98.1 F | BODY MASS INDEX: 20.89 KG/M2 | SYSTOLIC BLOOD PRESSURE: 111 MMHG | RESPIRATION RATE: 16 BRPM

## 2023-08-09 DIAGNOSIS — Z12.4 CERVICAL CANCER SCREENING: Primary | ICD-10-CM

## 2023-08-09 DIAGNOSIS — N90.813 FEMALE GENITAL MUTILATION TYPE III STATUS: ICD-10-CM

## 2023-08-09 LAB
HCG UR QL: NEGATIVE
INTERNAL QC OK POCT: NORMAL
POCT KIT EXPIRATION DATE: NORMAL
POCT KIT LOT NUMBER: NORMAL

## 2023-08-09 PROCEDURE — 12002 RPR S/N/AX/GEN/TRNK2.6-7.5CM: CPT

## 2023-08-09 PROCEDURE — 81025 URINE PREGNANCY TEST: CPT | Performed by: PATHOLOGY

## 2023-08-09 PROCEDURE — G0145 SCR C/V CYTO,THINLAYER,RESCR: HCPCS | Performed by: STUDENT IN AN ORGANIZED HEALTH CARE EDUCATION/TRAINING PROGRAM

## 2023-08-09 PROCEDURE — 87624 HPV HI-RISK TYP POOLED RSLT: CPT | Performed by: STUDENT IN AN ORGANIZED HEALTH CARE EDUCATION/TRAINING PROGRAM

## 2023-08-09 PROCEDURE — 12002 RPR S/N/AX/GEN/TRNK2.6-7.5CM: CPT | Performed by: STUDENT IN AN ORGANIZED HEALTH CARE EDUCATION/TRAINING PROGRAM

## 2023-08-09 PROCEDURE — 99000 SPECIMEN HANDLING OFFICE-LAB: CPT | Performed by: PATHOLOGY

## 2023-08-09 RX ORDER — IBUPROFEN 200 MG
800 TABLET ORAL ONCE
Status: DISCONTINUED | OUTPATIENT
Start: 2023-08-09 | End: 2023-08-10 | Stop reason: HOSPADM

## 2023-08-09 RX ORDER — ONDANSETRON 2 MG/ML
4 INJECTION INTRAMUSCULAR; INTRAVENOUS EVERY 30 MIN PRN
Status: DISCONTINUED | OUTPATIENT
Start: 2023-08-09 | End: 2023-08-09 | Stop reason: HOSPADM

## 2023-08-09 RX ORDER — OXYCODONE HYDROCHLORIDE 5 MG/1
5 TABLET ORAL EVERY 4 HOURS PRN
Status: DISCONTINUED | OUTPATIENT
Start: 2023-08-09 | End: 2023-08-10 | Stop reason: HOSPADM

## 2023-08-09 RX ORDER — ACETAMINOPHEN 325 MG/1
975 TABLET ORAL ONCE
Status: COMPLETED | OUTPATIENT
Start: 2023-08-09 | End: 2023-08-09

## 2023-08-09 RX ORDER — ONDANSETRON 2 MG/ML
INJECTION INTRAMUSCULAR; INTRAVENOUS PRN
Status: DISCONTINUED | OUTPATIENT
Start: 2023-08-09 | End: 2023-08-09

## 2023-08-09 RX ORDER — ACETAMINOPHEN 325 MG/1
325-650 TABLET ORAL EVERY 6 HOURS PRN
Qty: 50 TABLET | Refills: 0 | COMMUNITY
Start: 2023-08-09

## 2023-08-09 RX ORDER — KETOROLAC TROMETHAMINE 30 MG/ML
INJECTION, SOLUTION INTRAMUSCULAR; INTRAVENOUS PRN
Status: DISCONTINUED | OUTPATIENT
Start: 2023-08-09 | End: 2023-08-09

## 2023-08-09 RX ORDER — PROPOFOL 10 MG/ML
INJECTION, EMULSION INTRAVENOUS PRN
Status: DISCONTINUED | OUTPATIENT
Start: 2023-08-09 | End: 2023-08-09

## 2023-08-09 RX ORDER — FENTANYL CITRATE 50 UG/ML
INJECTION, SOLUTION INTRAMUSCULAR; INTRAVENOUS PRN
Status: DISCONTINUED | OUTPATIENT
Start: 2023-08-09 | End: 2023-08-09

## 2023-08-09 RX ORDER — SODIUM CHLORIDE, SODIUM LACTATE, POTASSIUM CHLORIDE, CALCIUM CHLORIDE 600; 310; 30; 20 MG/100ML; MG/100ML; MG/100ML; MG/100ML
INJECTION, SOLUTION INTRAVENOUS CONTINUOUS
Status: DISCONTINUED | OUTPATIENT
Start: 2023-08-09 | End: 2023-08-09 | Stop reason: HOSPADM

## 2023-08-09 RX ORDER — ONDANSETRON 4 MG/1
4 TABLET, ORALLY DISINTEGRATING ORAL EVERY 30 MIN PRN
Status: DISCONTINUED | OUTPATIENT
Start: 2023-08-09 | End: 2023-08-09 | Stop reason: HOSPADM

## 2023-08-09 RX ORDER — FENTANYL CITRATE 50 UG/ML
25 INJECTION, SOLUTION INTRAMUSCULAR; INTRAVENOUS EVERY 5 MIN PRN
Status: DISCONTINUED | OUTPATIENT
Start: 2023-08-09 | End: 2023-08-09 | Stop reason: HOSPADM

## 2023-08-09 RX ORDER — LIDOCAINE HYDROCHLORIDE 20 MG/ML
INJECTION, SOLUTION INFILTRATION; PERINEURAL PRN
Status: DISCONTINUED | OUTPATIENT
Start: 2023-08-09 | End: 2023-08-09

## 2023-08-09 RX ORDER — ONDANSETRON 4 MG/1
4 TABLET, ORALLY DISINTEGRATING ORAL EVERY 30 MIN PRN
Status: DISCONTINUED | OUTPATIENT
Start: 2023-08-09 | End: 2023-08-10 | Stop reason: HOSPADM

## 2023-08-09 RX ORDER — KETAMINE HYDROCHLORIDE 10 MG/ML
INJECTION INTRAMUSCULAR; INTRAVENOUS PRN
Status: DISCONTINUED | OUTPATIENT
Start: 2023-08-09 | End: 2023-08-09

## 2023-08-09 RX ORDER — IBUPROFEN 600 MG/1
600 TABLET, FILM COATED ORAL EVERY 6 HOURS PRN
COMMUNITY
Start: 2023-08-09 | End: 2023-10-25

## 2023-08-09 RX ORDER — HYDROMORPHONE HYDROCHLORIDE 1 MG/ML
0.4 INJECTION, SOLUTION INTRAMUSCULAR; INTRAVENOUS; SUBCUTANEOUS EVERY 5 MIN PRN
Status: DISCONTINUED | OUTPATIENT
Start: 2023-08-09 | End: 2023-08-09 | Stop reason: HOSPADM

## 2023-08-09 RX ORDER — DEXAMETHASONE SODIUM PHOSPHATE 4 MG/ML
INJECTION, SOLUTION INTRA-ARTICULAR; INTRALESIONAL; INTRAMUSCULAR; INTRAVENOUS; SOFT TISSUE PRN
Status: DISCONTINUED | OUTPATIENT
Start: 2023-08-09 | End: 2023-08-09

## 2023-08-09 RX ORDER — HYDRALAZINE HYDROCHLORIDE 20 MG/ML
2.5-5 INJECTION INTRAMUSCULAR; INTRAVENOUS EVERY 10 MIN PRN
Status: DISCONTINUED | OUTPATIENT
Start: 2023-08-09 | End: 2023-08-09 | Stop reason: HOSPADM

## 2023-08-09 RX ORDER — HYDROMORPHONE HYDROCHLORIDE 1 MG/ML
0.2 INJECTION, SOLUTION INTRAMUSCULAR; INTRAVENOUS; SUBCUTANEOUS EVERY 5 MIN PRN
Status: DISCONTINUED | OUTPATIENT
Start: 2023-08-09 | End: 2023-08-09 | Stop reason: HOSPADM

## 2023-08-09 RX ORDER — LIDOCAINE 40 MG/G
CREAM TOPICAL
Status: DISCONTINUED | OUTPATIENT
Start: 2023-08-09 | End: 2023-08-09 | Stop reason: HOSPADM

## 2023-08-09 RX ORDER — PROPOFOL 10 MG/ML
INJECTION, EMULSION INTRAVENOUS CONTINUOUS PRN
Status: DISCONTINUED | OUTPATIENT
Start: 2023-08-09 | End: 2023-08-09

## 2023-08-09 RX ORDER — ONDANSETRON 2 MG/ML
4 INJECTION INTRAMUSCULAR; INTRAVENOUS EVERY 30 MIN PRN
Status: DISCONTINUED | OUTPATIENT
Start: 2023-08-09 | End: 2023-08-10 | Stop reason: HOSPADM

## 2023-08-09 RX ORDER — OXYCODONE HYDROCHLORIDE 5 MG/1
5 TABLET ORAL
Status: DISCONTINUED | OUTPATIENT
Start: 2023-08-09 | End: 2023-08-10 | Stop reason: HOSPADM

## 2023-08-09 RX ORDER — ACETAMINOPHEN 325 MG/1
975 TABLET ORAL ONCE
Status: DISCONTINUED | OUTPATIENT
Start: 2023-08-09 | End: 2023-08-10 | Stop reason: HOSPADM

## 2023-08-09 RX ORDER — METOPROLOL TARTRATE 1 MG/ML
1-2 INJECTION, SOLUTION INTRAVENOUS EVERY 5 MIN PRN
Status: DISCONTINUED | OUTPATIENT
Start: 2023-08-09 | End: 2023-08-09 | Stop reason: HOSPADM

## 2023-08-09 RX ORDER — GLYCOPYRROLATE 0.2 MG/ML
INJECTION, SOLUTION INTRAMUSCULAR; INTRAVENOUS PRN
Status: DISCONTINUED | OUTPATIENT
Start: 2023-08-09 | End: 2023-08-09

## 2023-08-09 RX ORDER — FENTANYL CITRATE 50 UG/ML
25 INJECTION, SOLUTION INTRAMUSCULAR; INTRAVENOUS
Status: DISCONTINUED | OUTPATIENT
Start: 2023-08-09 | End: 2023-08-10 | Stop reason: HOSPADM

## 2023-08-09 RX ORDER — OXYCODONE HYDROCHLORIDE 5 MG/1
10 TABLET ORAL EVERY 4 HOURS PRN
Status: DISCONTINUED | OUTPATIENT
Start: 2023-08-09 | End: 2023-08-10 | Stop reason: HOSPADM

## 2023-08-09 RX ORDER — FENTANYL CITRATE 50 UG/ML
50 INJECTION, SOLUTION INTRAMUSCULAR; INTRAVENOUS EVERY 5 MIN PRN
Status: DISCONTINUED | OUTPATIENT
Start: 2023-08-09 | End: 2023-08-09 | Stop reason: HOSPADM

## 2023-08-09 RX ADMIN — FENTANYL CITRATE 50 MCG: 50 INJECTION, SOLUTION INTRAMUSCULAR; INTRAVENOUS at 08:29

## 2023-08-09 RX ADMIN — DEXAMETHASONE SODIUM PHOSPHATE 4 MG: 4 INJECTION, SOLUTION INTRA-ARTICULAR; INTRALESIONAL; INTRAMUSCULAR; INTRAVENOUS; SOFT TISSUE at 08:38

## 2023-08-09 RX ADMIN — GLYCOPYRROLATE 0.1 MG: 0.2 INJECTION, SOLUTION INTRAMUSCULAR; INTRAVENOUS at 08:31

## 2023-08-09 RX ADMIN — PROPOFOL 30 MG: 10 INJECTION, EMULSION INTRAVENOUS at 08:45

## 2023-08-09 RX ADMIN — SODIUM CHLORIDE, SODIUM LACTATE, POTASSIUM CHLORIDE, CALCIUM CHLORIDE: 600; 310; 30; 20 INJECTION, SOLUTION INTRAVENOUS at 07:35

## 2023-08-09 RX ADMIN — KETOROLAC TROMETHAMINE 30 MG: 30 INJECTION, SOLUTION INTRAMUSCULAR; INTRAVENOUS at 09:11

## 2023-08-09 RX ADMIN — OXYCODONE HYDROCHLORIDE 5 MG: 5 TABLET ORAL at 09:34

## 2023-08-09 RX ADMIN — ONDANSETRON 4 MG: 2 INJECTION INTRAMUSCULAR; INTRAVENOUS at 08:40

## 2023-08-09 RX ADMIN — LIDOCAINE HYDROCHLORIDE 40 MG: 20 INJECTION, SOLUTION INFILTRATION; PERINEURAL at 08:29

## 2023-08-09 RX ADMIN — FENTANYL CITRATE 25 MCG: 50 INJECTION, SOLUTION INTRAMUSCULAR; INTRAVENOUS at 09:02

## 2023-08-09 RX ADMIN — PROPOFOL 150 MCG/KG/MIN: 10 INJECTION, EMULSION INTRAVENOUS at 08:29

## 2023-08-09 RX ADMIN — FENTANYL CITRATE 25 MCG: 50 INJECTION, SOLUTION INTRAMUSCULAR; INTRAVENOUS at 08:44

## 2023-08-09 RX ADMIN — KETAMINE HYDROCHLORIDE 10 MG: 10 INJECTION INTRAMUSCULAR; INTRAVENOUS at 08:31

## 2023-08-09 RX ADMIN — ACETAMINOPHEN 975 MG: 325 TABLET ORAL at 07:33

## 2023-08-09 RX ADMIN — FENTANYL CITRATE 25 MCG: 50 INJECTION, SOLUTION INTRAMUSCULAR; INTRAVENOUS at 09:42

## 2023-08-09 NOTE — ANESTHESIA PREPROCEDURE EVALUATION
Anesthesia Pre-Procedure Evaluation    Patient: Janeth Parra   MRN: [id number] : 2000        Procedure : Procedure(s):  REVERSAL, FEMALE GENITAL MUTILATION AND PAP SMEAR          Past Medical History:   Diagnosis Date    Migraine       History reviewed. No pertinent surgical history.   No Known Allergies   Social History     Tobacco Use    Smoking status: Never    Smokeless tobacco: Never   Substance Use Topics    Alcohol use: Never      Wt Readings from Last 1 Encounters:   23 59 kg (130 lb)        Anesthesia Evaluation            ROS/MED HX  ENT/Pulmonary:       Neurologic:     (+)      migraines,                          Cardiovascular:       METS/Exercise Tolerance:     Hematologic:       Musculoskeletal:       GI/Hepatic:       Renal/Genitourinary:       Endo:       Psychiatric/Substance Use:       Infectious Disease:       Malignancy:       Other:            Physical Exam    Airway  airway exam normal      Mallampati: II   TM distance: > 3 FB   Neck ROM: full   Mouth opening: > 3 cm    Respiratory Devices and Support         Dental           Cardiovascular          Rhythm and rate: regular and normal     Pulmonary   pulmonary exam normal        breath sounds clear to auscultation           OUTSIDE LABS:  CBC:   Lab Results   Component Value Date    HGB 13.7 2016    HCT 42.8 2016     BMP:   Lab Results   Component Value Date    .0 2016    POTASSIUM 3.9 2016    CHLORIDE 100.2 2016    CO2 25.8 2016    BUN 8.5 2016    CR 0.7 2016    .0 (H) 2016     COAGS: No results found for: PTT, INR, FIBR  POC:   Lab Results   Component Value Date    HCG Negative 2023     HEPATIC:   Lab Results   Component Value Date    PROTTOTAL 7.6 2016    ALT 9.2 2016    AST 15.9 2016    ALKPHOS 91.2 2016    BILITOTAL 0.5 2016     OTHER:   Lab Results   Component Value Date    DANILO 9.5 2016    TSH 1.56 2016        Anesthesia Plan    ASA Status:  1    NPO Status:  NPO Appropriate    Anesthesia Type: MAC.     - Reason for MAC: immobility needed, straight local not clinically adequate   Induction: Intravenous.   Maintenance: TIVA.        Consents    Anesthesia Plan(s) and associated risks, benefits, and realistic alternatives discussed. Questions answered and patient/representative(s) expressed understanding.     - Discussed:     - Discussed with:  Patient      - Extended Intubation/Ventilatory Support Discussed: No.      - Patient is DNR/DNI Status: No     Use of blood products discussed: No .     Postoperative Care    Pain management: IV analgesics, Oral pain medications, Multi-modal analgesia.   PONV prophylaxis: Ondansetron (or other 5HT-3), Background Propofol Infusion     Comments:                Filiberto Menendez MD

## 2023-08-09 NOTE — DISCHARGE INSTRUCTIONS
[facility]  Home Care Following Anesthesia  For 24 hours after surgery:  Get plenty of rest.  A responsible adult must stay with you for at least 24 hours after you leave the surgery center.  Do not drive or use heavy equipment.  If you have weakness or tingling, don't drive or use heavy equipment until this feeling goes away.   Do not drink alcohol.   Avoid strenuous or risky activities.  Ask for help when climbing stairs.  You may feel lightheaded.  IF so, sit for a few minutes before standing.  Have someone help you get up.   If you have nausea (feel sick to your stomach): Drink only clear liquids such as apple juice, ginger ale, broth or 7-Up.  Rest may also help.  Be sure to drink enough fluids.  Move to a regular diet as you feel able.   You may have a slight fever.  Call the doctor if your fever is over 100 F (37.7 C) (taken under the tongue) or lasts longer than 24 hours.  You may have a dry mouth, a sore throat, muscle aches or trouble sleeping. These should go away after 24 hours.  Do not make important or legal decisions.   It is recommended to avoid smoking.               Tips for taking pain medications  To get the best pain relief possible, remember these points:  Take pain medications as directed, before pain becomes severe.  Pain medication can upset your stomach: taking it with food may help.  Constipation is a common side effect of pain medication. Drink plenty of  fluids.  Eat foods high in fiber. Take a stool softener if recommended by your doctor or pharmacist.  Do not drink alcohol, drive or operate machinery while taking pain medications.  Ask about other ways to control pain, such as with heat, ice or relaxation.    Tylenol/Acetaminophen Consumption    If you feel your pain relief is insufficient, you may take Tylenol/Acetaminophen in addition to your narcotic pain medication.   Be careful not to exceed 4,000 mg of Tylenol/Acetaminophen in a 24 hour  period from all sources.  If you are taking extra strength Tylenol/acetaminophen (500 mg), the maximum dose is 8 tablets in 24 hours.  If you are taking regular strength acetaminophen (325 mg), the maximum dose is 12 tablets in 24 hours.  You took 975 mg of tylenol at 7:30 am. You can take additional tylenol after 1:30 pm.  You took 30mg of toradol at 9:10 am. You can take additional ibuprofen after 3:10 pm.      Call a doctor for any of the following:  Signs of infection (fever, growing tenderness at the surgery site, a large amount of drainage or bleeding, severe pain, foul-smelling drainage, redness, swelling).  It has been over 8 to 10 hours since surgery and you are still not able to urinate (pass water).  Headache for over 24 hours.  Signs of Covid-19 infection (temperature over 100 degrees, shortness of breath, cough, loss of taste/smell, generalized body aches, persistent headache, chills, sore throat, nausea/vomiting/diarrhea)  Your doctor is:  Dr. Marjorie Cavazos, OB/GYN: [phone]                    Or dial [phone] and ask for the resident on call for:  OB/GYN  For emergency care, call the:  [facility] Emergency Department: [phone] (TTY for hearing impaired: [phone])

## 2023-08-09 NOTE — ANESTHESIA POSTPROCEDURE EVALUATION
Patient: Janeth Parra    Procedure: Procedure(s):  REVERSAL, FEMALE GENITAL MUTILATION AND PAP SMEAR       Anesthesia Type:  MAC    Note:  Disposition: Outpatient   Postop Pain Control: Uneventful            Sign Out: Well controlled pain   PONV: No   Neuro/Psych: Uneventful            Sign Out: Acceptable/Baseline neuro status   Airway/Respiratory: Uneventful            Sign Out: Acceptable/Baseline resp. status   CV/Hemodynamics: Uneventful            Sign Out: Acceptable CV status   Other NRE: NONE   DID A NON-ROUTINE EVENT OCCUR? No           Last vitals:  Vitals Value Taken Time   /71 08/09/23 1015   Temp 36.7  C (98.1  F) 08/09/23 1015   Pulse     Resp 16 08/09/23 1015   SpO2 100 % 08/09/23 1015       Electronically Signed By: Filiberto Mennedez MD  August 9, 2023  3:48 PM

## 2023-08-09 NOTE — OP NOTE
[facility]  Operative Note    Name: Janeth Parra  MRN: [id number]  : 2000  Date of surgery: 2023    Preoperative diagnosis:  -History of female genital mutilation    Postoperative diagnosis:  -Same as above    Procedure(s):  -Exam under anesthesia, reversal of female genital mutilation, Pap test    Surgeon: Marjorie Cavazos MD    Anesthesia: MAC with local  EBL: 5 mL  Urine output: 12 ounces    Specimens: Pap Test    Indications:  Janeth Parra is a 23-year-old G0 with a history of female genital mutilation desiring defibulation to allow for tampon use and intercourse.  Exam in clinic with either type III or type II FGM.  Risks, and benefits of the procedure were discussed and informed consent was signed.    Findings: After defibulation, a type II FGM procedure was noted with partial removal of the clitoral glans and removal of the right labia minora and partial removal of the left labia minora.  Normal urethra.  Normal-appearing vagina and cervix and small, mobile uterus.  Following procedure vaginal introitus noted to be 4 cm and able to admit a speculum without difficulty.     Complications: None apparent    Procedure:  The patient was taken to the operating room where she was placed in the dorsal lithotomy position with feet in yellow fin stirrups.  She was placed under MAC anesthesia.  An exam under anesthesia was performed.  An external prep was done and the area was draped.  A curved clamp was placed under the area of infibulation and used to elevate the area away from underlying structures.  8 cc of 1% lidocaine was injected along the area to be excised.  A scalpel was used to defibulate the labia majora in the midline until reaching the level of the clitoris.  A partial remnant of the clitoral brown and clitoral glans was noted to be remaining.  Raw edges of the labia majora were reapproximated using interrupted stitches of 4-0 Vicryl.  The bladder was then catheterized with clear urine  noted.  A speculum was placed into the vagina.  A normal cervix was noted and Pap test collected.  Bimanual exam revealed a small, mobile uterus.  All instruments removed from the vagina.  Hemostasis was noted.  Bacitracin was applied to bilateral labia majora.      Instrument counts were correct x2. The patient was awoken in the OR and transferred to the PACU in stable condition.    Marjorie Cavazos MD

## 2023-08-09 NOTE — ANESTHESIA CARE TRANSFER NOTE
Patient: Janeth Parra    Procedure: Procedure(s):  REVERSAL, FEMALE GENITAL MUTILATION AND PAP SMEAR       Diagnosis: Female genital mutilation type III status [N90.813]  Diagnosis Additional Information: No value filed.    Anesthesia Type:   MAC     Note:    Oropharynx: oropharynx clear of all foreign objects and spontaneously breathing  Level of Consciousness: drowsy  Oxygen Supplementation: room air    Independent Airway: airway patency satisfactory and stable  Dentition: dentition unchanged  Vital Signs Stable: post-procedure vital signs reviewed and stable  Report to RN Given: handoff report given  Patient transferred to: Phase II    Handoff Report: Identifed the Patient, Identified the Reponsible Provider, Reviewed the pertinent medical history, Discussed the surgical course, Reviewed Intra-OP anesthesia mangement and issues during anesthesia, Set expectations for post-procedure period and Allowed opportunity for questions and acknowledgement of understanding      Vitals:  Vitals Value Taken Time   /59    Temp 97.7    Pulse 78    Resp 18    SpO2 100        Electronically Signed By: OBDULIA Quiroga CRNA  August 9, 2023  9:23 AM

## 2023-08-10 LAB
HUMAN PAPILLOMA VIRUS 16 DNA: NEGATIVE
HUMAN PAPILLOMA VIRUS 18 DNA: NEGATIVE
HUMAN PAPILLOMA VIRUS FINAL DIAGNOSIS: NORMAL
HUMAN PAPILLOMA VIRUS OTHER HR: NEGATIVE

## 2023-08-14 LAB
BKR LAB AP GYN ADEQUACY: NORMAL
BKR LAB AP GYN INTERPRETATION: NORMAL
BKR LAB AP HPV REFLEX: NO
BKR LAB AP PREVIOUS ABNORMAL: NORMAL
PATH REPORT.COMMENTS IMP SPEC: NORMAL
PATH REPORT.COMMENTS IMP SPEC: NORMAL
PATH REPORT.RELEVANT HX SPEC: NORMAL

## 2023-10-02 ENCOUNTER — MYC MEDICAL ADVICE (OUTPATIENT)
Dept: FAMILY MEDICINE | Facility: CLINIC | Age: 23
End: 2023-10-02
Payer: MEDICAID

## 2023-10-02 DIAGNOSIS — R10.84 ABDOMINAL PAIN, GENERALIZED: Primary | ICD-10-CM

## 2023-10-02 NOTE — TELEPHONE ENCOUNTER
Addended by: MARGO SALINAS on: 2/17/2021 10:26 AM     Modules accepted: Orders     Seeder message sent with referral number to make apt.   Thanks,   Kadeem Brownlee RN  [facility]

## 2023-10-11 ENCOUNTER — OFFICE VISIT (OUTPATIENT)
Dept: OBGYN | Facility: CLINIC | Age: 23
End: 2023-10-11
Attending: STUDENT IN AN ORGANIZED HEALTH CARE EDUCATION/TRAINING PROGRAM
Payer: COMMERCIAL

## 2023-10-11 VITALS
HEART RATE: 80 BPM | HEIGHT: 66 IN | BODY MASS INDEX: 21.02 KG/M2 | WEIGHT: 130.8 LBS | SYSTOLIC BLOOD PRESSURE: 128 MMHG | DIASTOLIC BLOOD PRESSURE: 74 MMHG

## 2023-10-11 DIAGNOSIS — Z98.890 POSTOPERATIVE STATE: Primary | ICD-10-CM

## 2023-10-11 DIAGNOSIS — Z23 NEED FOR INFLUENZA VACCINATION: ICD-10-CM

## 2023-10-11 PROCEDURE — 90686 IIV4 VACC NO PRSV 0.5 ML IM: CPT

## 2023-10-11 PROCEDURE — G0008 ADMIN INFLUENZA VIRUS VAC: HCPCS

## 2023-10-11 PROCEDURE — 250N000011 HC RX IP 250 OP 636

## 2023-10-11 PROCEDURE — G0463 HOSPITAL OUTPT CLINIC VISIT: HCPCS | Mod: 25 | Performed by: STUDENT IN AN ORGANIZED HEALTH CARE EDUCATION/TRAINING PROGRAM

## 2023-10-11 PROCEDURE — 99212 OFFICE O/P EST SF 10 MIN: CPT | Performed by: STUDENT IN AN ORGANIZED HEALTH CARE EDUCATION/TRAINING PROGRAM

## 2023-10-11 ASSESSMENT — PAIN SCALES - GENERAL: PAINLEVEL: NO PAIN (0)

## 2023-10-11 NOTE — LETTER
"10/11/2023       RE: Janeth Parra  [address]   [place] [address]     Dear Colleague,    Thank you for referring your patient, Janeth Parra, to the [facility]. Please see a copy of my visit note below.    [facility]    Reason for visit: post-op visit    HPI: Janeth Parra is a 23 year old  who presents to clinic today for a postoperative visit following a reversal of FGM on 23.  Since surgery she has had increased itching for two weeks that resolved with healing. Used topical estrogen for the first 2 weeks. She never had any drainage or fevers during that time. She has noticed that her urine stream is not straight anymore and urine goes in various directions when she has her legs open during urination. She does not have any urgency, burning or blood with urination. Also has noticed there is an area near what remains of the clitoris that was not entirely resected.     Pap NILM, HPV neg    Objective:  /74   Pulse 80   Ht 1.676 m (5' 6\")   Wt 59.3 kg (130 lb 12.8 oz)   LMP 2023 (Exact Date)   BMI 21.11 kg/m    General: Well-appearing  Pelvic: Bilateral labia with edges well healed. Adequate vaginal introitus with no obstruction of urethra. No urethra caruncles or masses. Approximately 3mm of skin bridge remaining inferior to remnant of clitoral glans.     Assessment and plan:  -Normal postoperative recovery  -Discussed that remaining bridge of tissue is likely not contributing to changes in urinary stream. Patient was advised to retract anterior labia superiorly during urination to see if this helps. If this resolves urinary stream issues, discussed that this tissue could possibly be further  in clinic or in the OR. Discussed that tissue could also be  for cosmesis, however will need to be done carefully as not to harm remaining " clitoris or blood supply. Patient declines at this time and will see how things go with continued healing.   -May return to full activity including intercourse  -Follow-up: Return to annual healthcare maintenance  -S/P flu vaccine today    Jennifer Oneal, MS3    I was present with the medical student who participated in the service and in the documentation of the note. I have verified the history and personally performed the physical exam and medical decision making. I agree with the assessment and plan of care as documented in the note.    Marjorie Cavazos MD

## 2023-10-11 NOTE — PATIENT INSTRUCTIONS
Thank you for trusting us with your care!     If you need to contact us for questions about:  Symptoms, Scheduling & Medical Questions; Non-urgent (2-3 day response) Kelli message, Urgent (needing response today) [phone] (if after 3:30pm next day response)   Prescriptions: Please call your Pharmacy   Billing: [facility] [phone] or [facility]:[phone]

## 2023-10-16 ENCOUNTER — VIRTUAL VISIT (OUTPATIENT)
Dept: GASTROENTEROLOGY | Facility: CLINIC | Age: 23
End: 2023-10-16
Attending: FAMILY MEDICINE
Payer: COMMERCIAL

## 2023-10-16 DIAGNOSIS — A04.8 H. PYLORI INFECTION: ICD-10-CM

## 2023-10-16 DIAGNOSIS — R10.10 UPPER ABDOMINAL PAIN: Primary | ICD-10-CM

## 2023-10-16 PROCEDURE — 99203 OFFICE O/P NEW LOW 30 MIN: CPT | Mod: 95 | Performed by: PHYSICIAN ASSISTANT

## 2023-10-16 RX ORDER — NICOTINE POLACRILEX 4 MG/1
20 GUM, CHEWING ORAL 2 TIMES DAILY
Qty: 60 TABLET | Refills: 1 | Status: SHIPPED | OUTPATIENT
Start: 2023-10-16 | End: 2023-10-16

## 2023-10-16 RX ORDER — NICOTINE POLACRILEX 4 MG/1
20 GUM, CHEWING ORAL 2 TIMES DAILY
Qty: 60 TABLET | Refills: 1 | Status: SHIPPED | OUTPATIENT
Start: 2023-10-16

## 2023-10-16 NOTE — PATIENT INSTRUCTIONS
It was a pleasure taking care of you today.  I've included a brief summary of our discussion and care plan from today's visit below.  Please review this information with your primary care provider.  _______________________________________________________________________     My recommendations are summarized as follows:       - start taking omeprazole 20mg twice daily. This is best taken on an empty stomach about 30 minutes before a meal   - STOP ADVIL and similar medications: ibuprofen, motrin, aleve, naproxen.   - you CAN instead take tylenol as needed for headaches      ______________________________________________________________________     How do I schedule labs, imaging studies, or procedures that were ordered in clinic today?      Labs: To schedule lab appointment you can contact your local [facility] or call [phone] to schedule at any convenient [facility] location.     Procedures: If a colonoscopy, upper endoscopy, breath test, esophageal manometry, or pH impedence was ordered today, our endoscopy team will call you to schedule this. If you have not heard from our endoscopy team within a week, please call [phone] to schedule.      Imaging Studies: If you were scheduled for a CT scan, X-ray, MRI, ultrasound, HIDA scan or other imaging study, please call [phone] to have this scheduled.      Referral: If a referral to another specialty was ordered, expect a phone call or follow instructions above. If you have not heard from anyone regarding your referral in a week, please call our clinic to check the status.      Who do I call with any questions after my visit?  Please be in touch if there are any further questions that arise following today's visit.  There are multiple ways to contact your gastroenterology care team.       During business hours, you may reach a Gastroenterology nurse at [phone]     To schedule or reschedule an appointment, please call [phone].       You can always send a secure message through [employer].  [employer] messages are answered by your nurse or doctor typically within 24 hours.  Please allow extra time on weekends and holidays.       For urgent/emergent questions after business hours, you may reach the on-call GI Fellow by contacting the [facility]  at [phone].     How will I get the results of any tests ordered?    You will receive all of your results.  If you have signed up for [employer], any tests ordered at your visit will be available to you after your physician reviews them.  Typically this takes 1-2 weeks.  If there are urgent results that require a change in your care plan, your physician or nurse will call you to discuss the next steps.       What is [employer]?  [employer] is a secure way for you to access all of your healthcare records from the [facility].  It is a web based computer program, so you can sign on to it from any location.  It also allows you to send secure messages to your care team.  I recommend signing up for [employer] access if you have not already done so and are comfortable with using a computer.       How to I schedule a follow-up visit?  If you did not schedule a follow-up visit today, please call [phone] to schedule a follow-up office visit.      Zulema Ramos PA-C  Division of Gastroenterology, Hepatology and Nutrition   [facility]

## 2023-10-16 NOTE — NURSING NOTE
Is the patient currently in the state of [place]? YES    Visit mode:VIDEO    If the visit is dropped, the patient can be reconnected by: VIDEO VISIT: Send to e-mail at: [email or web address]    Will anyone else be joining the visit? NO  (If patient encounters technical issues they should call [phone]    How would you like to obtain your AVS? MyChart    Are changes needed to the allergy or medication list? No    Reason for visit: Consult    Noel PHILLIPS

## 2023-10-16 NOTE — PROGRESS NOTES
GI NEW PATIENT VISIT     CC/REFERRING MD:    MD Pramod Jenkins, DO     REASON FOR CONSULTATION:   Referred by Pramod Brownlee for Consult      HISTORY OF PRESENT ILLNESS:    Janeth Parra is 23 year old female who presents for GI consult.     She has had H pylori a few times now over the past few years. Believes she has been treated about three times with only temporary improvement in symptoms. Last tested positive in May 2023. She is currently having abdominal pain daily, seems to bother her more towards night time. Sometimes it keeps her up at night. No nausea. No vomiting. No reflux. No hematochezia or melena. Her weight is stable. She is currently taking advil daily for headaches. She was given elavil and Imitrex however these were ineffective so she returned back to advil.     She denies having diarrhea or constipation. Having a BM about daily. She was previously constipated but reports this has resolved.     Family members have similar GI symptoms.         Janeth  has a past medical history of Migraine.    She  has a past surgical history that includes Revise circumcision female (N/A, 8/9/2023).    She  reports that she has never smoked. She has never used smokeless tobacco. She reports that she does not drink alcohol and does not use drugs.    Her family history includes Constipation in her sister.    ALLERGIES:  Patient has no known allergies.        PERTINENT MEDICATIONS:    Current Outpatient Medications:     acetaminophen (TYLENOL) 325 MG tablet, Take 1-2 tablets (325-650 mg) by mouth every 6 hours as needed for mild pain (Patient not taking: Reported on 10/11/2023), Disp: 50 tablet, Rfl: 0    amitriptyline (ELAVIL) 25 MG tablet, Take 1 tablet (25 mg) by mouth At Bedtime (Patient not taking: Reported on 10/11/2023), Disp: 30 tablet, Rfl: 5    ibuprofen (ADVIL/MOTRIN) 600 MG tablet, Take 1 tablet (600 mg) by mouth every 6 hours as needed for other (mild and/or inflammatory  pain) (Patient not taking: Reported on 10/11/2023), Disp: , Rfl:     SUMAtriptan (IMITREX) 50 MG tablet, Take 1-2 tablets ( mg) by mouth at onset of headache for migraine May repeat in 2 hours. Max 4 tablets/24 hours. (Patient not taking: Reported on 10/11/2023), Disp: 12 tablet, Rfl: 9      PHYSICAL EXAMINATION:  Constitutional: aaox3, cooperative, pleasant, not dyspneic/diaphoretic, no acute distress      GENERAL: Healthy, alert and no distress  EYES: Eyes grossly normal to inspection.  No discharge or erythema, or obvious scleral/conjunctival abnormalities.  RESP: No audible wheeze, cough, or visible cyanosis.  No visible retractions or increased work of breathing.    SKIN: Visible skin clear. No significant rash, abnormal pigmentation or lesions.  NEURO: Cranial nerves grossly intact.  Mentation and speech appropriate for age.  PSYCH: Mentation appears normal, affect normal/bright, judgement and insight intact, normal speech and appearance well-groomed.        ASSESSMENT/PLAN:    Upper abdominal pain   H pylori infection     Janeth Parra is a 23 year old female who presents for GI consult. Reports having history of h pylori over the past 3 years with multiple treatments. Has had only temporary improvement. She continues to be symptomatic. We reviewed resistant H pylori. Recommended referral to [facility] pharmacist to assist in treatment. In the interim I will start her on omeprazole 20mg twice daily.  If she continues to be symptomatic despite above measures, then will consider EGD.     Patient also using Advil frequently. Explained how this can cause/exacerbate GI issues. Strongly advised to discontinue. Try tylenol for headaches until able to meet with neurologist to review management options.     Family members also have similar symptoms, encouraged family members to be tested for H pylori.           Thank you for this consultation.  It was a pleasure to participate in the care of this patient; please  contact us with any further questions.        This note was created with voice recognition software, and while reviewed for accuracy, typos may remain.       I spent a total of 39 minutes on the day of the visit.   Time spent by me doing chart review, history and exam, documentation and further activities per the note      Zulema Ramos PA-C  Division of Gastroenterology, Hepatology and Nutrition   [facility]            Video-Visit Details    Type of service:  Video Visit  Joined the call at 10/16/2023, 2:30:42 pm.    Left the call at 10/16/2023, 2:52:16 pm.    You were on the call for 21 minutes 34 seconds .    Originating Location (pt. Location): Home    Distant Location (provider location):  Off-site    Platform used for Video Visit: Karan

## 2023-10-24 ENCOUNTER — VIRTUAL VISIT (OUTPATIENT)
Dept: GASTROENTEROLOGY | Facility: CLINIC | Age: 23
End: 2023-10-24
Attending: PHYSICIAN ASSISTANT
Payer: COMMERCIAL

## 2023-10-24 DIAGNOSIS — A04.8 H. PYLORI INFECTION: Primary | ICD-10-CM

## 2023-10-24 DIAGNOSIS — G43.909 MIGRAINE: ICD-10-CM

## 2023-10-24 RX ORDER — CALCIUM CARBONATE 500 MG/1
1 TABLET, CHEWABLE ORAL 2 TIMES DAILY PRN
COMMUNITY

## 2023-10-24 NOTE — PROGRESS NOTES
Medication Therapy Management (MTM) Encounter    ASSESSMENT:                            Medication Adherence/Access: No issues identified    H. Pylori:  Janeth would benefit from salvage therapy for treatment of H. Pylori. Given her past metronidazole exposure and failure of bismuth quadruple therapy, I recommend treatment with levofloxacin-based triple therapy. We will use high dose omeprazole as her insurance has a quantity limit on esomeprazole that disallows twice-daily therapy, which is optimal. There is a theoretical interaction between gingko biloba and omeprazole which may reduce omeprazole effectiveness, so she will hold gingko biloba therapy while on H. Pylori treatment. She will be indicated for eradication testing at least 4 weeks after completion of antibiotics. PPI therapy should be withheld for 1-2 weeks prior to eradication testing. If this regimen is ineffective, consideration should be given to rifabutin-based therapy.     Migraine: Has been working closely with Neurology. Currently getting by with APAP.    PLAN:                            I recommend levofloxacin triple therapy x 14 days:  Omeprazole 40 mg twice daily  Levofloxacin 500 mg once daily  Amoxicillin 500 mg 2 capsules (1000 mg) by mouth twice daily  Wait to start Gingko Biloba after finishing antibiotics. Don't take the calcium carbonate antacid fruit chewable while on antibiotics.   Stop taking omeprazole 20 mg while you are on treatment for H. Pylori. We are having you do a higher dose (omeprazole 40 mg twice daily x 14 days). You can restart the omeprazole 20 mg after you finish testing to make sure the H. Pylori is gone.   Take acetaminophen (brand name: Tylenol) for mild aches and pain. Avoid NSAIDs, which include aspirin, ibuprofen (Advil, Motrin), and naproxen (Aleve, Naprosyn).   If you are having questions about your migraine treatment, please reach out to me and we can discuss medication options. You can then discuss that  information with your neurologist.      Follow-up: 1 week check-in; follow-up testing at least 4 weeks after completion of antibiotics    EDUCATION:     We reviewed H pylori today including background information, indications for treatment and potential modes of transmission. We also discussed potential for antibiotic resistance and importance of finishing treatment if able, as well as follow-up testing. Discussed levofloxacin triple therapy today including medications, dosing, side effects, precautions and general counseling information.    SUBJECTIVE/OBJECTIVE:                          Janeth Parra is a 23 year old female called for an initial visit. She was referred to me from Zulema Ramos PA-C. Switched to telephonic visit with her permission as she had not joined the video conference by the appointment start time.     Reason for visit: H. Pylori salvage therapy.    Allergies/ADRs: Reviewed in chart  Past Medical History: Reviewed in chart  Tobacco: She reports that she has never smoked. She has never used smokeless tobacco.  Alcohol: none      Medication Adherence/Access: no issues reported    H. Pylori:   Omeprazole 20 mg twice daily     Most recent positive H. Pylori stool antigen test 5/21/23. Reports that her family members have similar GI symptoms. Testing household members has been previously encouraged. No negative H. Pylori stool antigen tests are on file.     She reports that she was able to take her previous treatment regimens as directed, and adherence was not a concern.     Previous macrolide exposure: unclear   Penicillin allergy: no  Pregnant/breastfeeding: No   Last GFR: >60 ml/min 7/4/23  No chronic liver disease; liver enzymes normal 5/6/22  Aortic aneurysm: no  History of tendon rupture: no    Previous treatment regimens:   10/7/2020: amoxicillin 1000 mg twice daily x 14 days, metronidazole 500 mg twice daily x 14 days, omeprazole 20 mg twice daily x 14 days    5/10/22: bismuth subsalicylate  262 mg four times daily x 14 days, metronidazole 250 mg four times daily x 14 days, tetracycline 500 mg four times daily x 14 days, omeprazole 20 mg twice daily x 14 days    Migraine:   Acetaminophen as needed  Ginkgo Biloba 500 mg twice daily     Amitriptypine and sumatriptan were not effective for her and she was told to stop taking them. She has follow up with neurology. I emphasized previous education that she should minimize or avoid NSAID use due to risk of ulcers and worsening symptoms of gastritis. She has not started taking the Ginkgo Biloba yet to help with migraine prevention.   ----------------      I spent 24 minutes with this patient today. All changes were made via collaborative practice agreement with Zulema Ramos PA-C. A copy of the visit note was provided to the patient's provider(s).    A summary of these recommendations was sent via [email or web address].    Too Martinez, PharmD, BCPS  MTM Pharmacist   [facility] Gastroenterology  Phone: [phone]    Telemedicine Visit Details  Type of service:  Telephone visit  Start Time:  2:05 PM  End Time: 2:29 PM     Medication Therapy Recommendations  No medication therapy recommendations to display

## 2023-10-24 NOTE — Clinical Note
10/24/2023         RE: Janeth Parra  [address]   [facility] [address]        Dear Colleague,    Thank you for referring your patient, Janeth Parra, to the [facility]. Please see a copy of my visit note below.    Medication Therapy Management (MTM) Encounter    ASSESSMENT:                            Medication Adherence/Access: No issues identified    H. Pylori:  ***      PLAN:                            I recommend levofloxacin triple therapy x 14 days:  Omeprazole 40 mg twice daily  Levofloxacin 500 mg once daily  Amoxicillin 500 mg 2 capsules (1000 mg) by mouth twice daily  Wait to start Gingko Biloba after finishing antibiotics. Don't take the calcium carbonate antacid fruit chewable while on antibiotics.   Stop taking omeprazole 20 mg while you are on treatment for H. Pylori. We are having you do a higher dose (omeprazole 40 mg twice daily x 14 days). You can restart the omeprazole 20 mg after you finish testing to make sure the H. Pylori is gone.   Take acetaminophen (brand name: Tylenol) for mild aches and pain. Avoid NSAIDs, which include aspirin, ibuprofen (Advil, Motrin), and naproxen (Aleve, Naprosyn).     Follow-up: 1 week check-in; follow-up testing at least 4 weeks after completion of antibiotics    EDUCATION:     We reviewed H pylori today including background information, indications for treatment and potential modes of transmission. We also discussed potential for antibiotic resistance and importance of finishing treatment if able, as well as follow-up testing. Discussed levofloxacin triple therapy today including medications, dosing, side effects, precautions and general counseling information.    SUBJECTIVE/OBJECTIVE:                          Janeth Parra is a 23 year old female called for an initial visit. She was referred to me from Zulema Ramos PA-C. Switched to telephonic visit with her permission as she had not joined the video  conference by the appointment start time.     Reason for visit: H. Pylori salvage therapy.    Allergies/ADRs: Reviewed in chart  Past Medical History: Reviewed in chart  Tobacco: She reports that she has never smoked. She has never used smokeless tobacco.  Alcohol: none      Medication Adherence/Access: no issues reported    H. Pylori:   Omeprazole 20 mg twice daily     Most recent positive H. Pylori stool antigen test 5/21/23. Reports that her family members have similar GI symptoms. Testing household members has been previously encouraged. No negative H. Pylori stool antigen tests are on file.     She reports that she was able to take her previous treatment regimens as directed, and adherence was not a concern.     Previous macrolide exposure: unclear   Penicillin allergy: no  Pregnant/breastfeeding: No   Last GFR: >60 ml/min 7/4/23  No chronic liver disease; liver enzymes normal 5/6/22  Aortic aneurysm: no  History of tendon rupture: no    Previous treatment regimens:   10/7/2020: amoxicillin 1000 mg twice daily x 14 days, metronidazole 500 mg twice daily x 14 days, omeprazole 20 mg twice daily x 14 days    5/10/22: bismuth subsalicylate 262 mg four times daily x 14 days, metronidazole 250 mg four times daily x 14 days, tetracycline 500 mg four times daily x 14 days, omeprazole 20 mg twice daily x 14 days    Migraine:   Amitriptyline 25 mg at bedtime - not taking  Sumatriptan 50 mg PRN - not taking  Acetaminophen  Ginkgo Biloba 500 mg twice daily   ----------------      I spent 24 minutes with this patient today. All changes were made via collaborative practice agreement with Zulema Ramos PA-C. A copy of the visit note was provided to the patient's provider(s).    A summary of these recommendations was sent via Mbite.    Too Martinez, PharmD, BCPS  MTM Pharmacist   [facility] Gastroenterology  Phone: [phone]    Telemedicine Visit Details  Type of service:  Telephone visit  Start Time:  2:05  PM  End Time: 2:29 PM     Medication Therapy Recommendations  No medication therapy recommendations to display       Medication Therapy Management (MTM) Encounter    ASSESSMENT:                            Medication Adherence/Access: No issues identified    H. Pylori:  Janeth would benefit from salvage therapy for treatment of H. Pylori. Given her past metronidazole exposure and failure of bismuth quadruple therapy, I recommend treatment with levofloxacin-based triple therapy. We will use high dose omeprazole as her insurance has a quantity limit on esomeprazole that disallows twice-daily therapy, which is optimal. There is a theoretical interaction between gingko biloba and omeprazole which may reduce omeprazole effectiveness, so she will hold gingko biloba therapy while on H. Pylori treatment. She will be indicated for eradication testing at least 4 weeks after completion of antibiotics. PPI therapy should be withheld for 1-2 weeks prior to eradication testing. If this regimen is ineffective, consideration should be given to rifabutin-based therapy.     Migraine: Has been working closely with Neurology. Currently getting by with APAP.    PLAN:                            I recommend levofloxacin triple therapy x 14 days:  Omeprazole 40 mg twice daily  Levofloxacin 500 mg once daily  Amoxicillin 500 mg 2 capsules (1000 mg) by mouth twice daily  Wait to start Gingko Biloba after finishing antibiotics. Don't take the calcium carbonate antacid fruit chewable while on antibiotics.   Stop taking omeprazole 20 mg while you are on treatment for H. Pylori. We are having you do a higher dose (omeprazole 40 mg twice daily x 14 days). You can restart the omeprazole 20 mg after you finish testing to make sure the H. Pylori is gone.   Take acetaminophen (brand name: Tylenol) for mild aches and pain. Avoid NSAIDs, which include aspirin, ibuprofen (Advil, Motrin), and naproxen (Aleve, Naprosyn).   If you are having questions about  your migraine treatment, please reach out to me and we can discuss medication options. You can then discuss that information with your neurologist.      Follow-up: 1 week check-in; follow-up testing at least 4 weeks after completion of antibiotics    EDUCATION:     We reviewed H pylori today including background information, indications for treatment and potential modes of transmission. We also discussed potential for antibiotic resistance and importance of finishing treatment if able, as well as follow-up testing. Discussed levofloxacin triple therapy today including medications, dosing, side effects, precautions and general counseling information.    SUBJECTIVE/OBJECTIVE:                          Janeth Parra is a 23 year old female called for an initial visit. She was referred to me from Zulema Ramos PA-C. Switched to telephonic visit with her permission as she had not joined the video conference by the appointment start time.     Reason for visit: H. Pylori salvage therapy.    Allergies/ADRs: Reviewed in chart  Past Medical History: Reviewed in chart  Tobacco: She reports that she has never smoked. She has never used smokeless tobacco.  Alcohol: none      Medication Adherence/Access: no issues reported    H. Pylori:   Omeprazole 20 mg twice daily     Most recent positive H. Pylori stool antigen test 5/21/23. Reports that her family members have similar GI symptoms. Testing household members has been previously encouraged. No negative H. Pylori stool antigen tests are on file.     She reports that she was able to take her previous treatment regimens as directed, and adherence was not a concern.     Previous macrolide exposure: unclear   Penicillin allergy: no  Pregnant/breastfeeding: No   Last GFR: >60 ml/min 7/4/23  No chronic liver disease; liver enzymes normal 5/6/22  Aortic aneurysm: no  History of tendon rupture: no    Previous treatment regimens:   10/7/2020: amoxicillin 1000 mg twice daily x 14 days,  metronidazole 500 mg twice daily x 14 days, omeprazole 20 mg twice daily x 14 days    5/10/22: bismuth subsalicylate 262 mg four times daily x 14 days, metronidazole 250 mg four times daily x 14 days, tetracycline 500 mg four times daily x 14 days, omeprazole 20 mg twice daily x 14 days    Migraine:   Acetaminophen as needed  Ginkgo Biloba 500 mg twice daily     Amitriptypine and sumatriptan were not effective for her and she was told to stop taking them. She has follow up with neurology. I emphasized previous education that she should minimize or avoid NSAID use due to risk of ulcers and worsening symptoms of gastritis. She has not started taking the Ginkgo Biloba yet to help with migraine prevention.   ----------------      I spent 24 minutes with this patient today. All changes were made via collaborative practice agreement with Zulema Ramos PA-C. A copy of the visit note was provided to the patient's provider(s).    A summary of these recommendations was sent via [email or web address].    Too Martinez, PharmD, BCPS  MTM Pharmacist   [facility] Gastroenterology  Phone: [phone]    Telemedicine Visit Details  Type of service:  Telephone visit  Start Time:  2:05 PM  End Time: 2:29 PM     Medication Therapy Recommendations  No medication therapy recommendations to display         Again, thank you for allowing me to participate in the care of your patient.        Sincerely,        Too Martinez [facility]

## 2023-10-25 RX ORDER — LEVOFLOXACIN 500 MG/1
500 TABLET, FILM COATED ORAL DAILY
Qty: 14 TABLET | Refills: 0 | Status: SHIPPED | OUTPATIENT
Start: 2023-10-25 | End: 2023-11-27

## 2023-10-25 RX ORDER — OMEPRAZOLE 40 MG/1
40 CAPSULE, DELAYED RELEASE ORAL 2 TIMES DAILY
Qty: 28 CAPSULE | Refills: 0 | Status: SHIPPED | OUTPATIENT
Start: 2023-10-25 | End: 2023-11-27

## 2023-10-25 RX ORDER — AMOXICILLIN 500 MG/1
1000 CAPSULE ORAL 2 TIMES DAILY
Qty: 56 CAPSULE | Refills: 0 | Status: SHIPPED | OUTPATIENT
Start: 2023-10-25 | End: 2023-11-08

## 2023-10-26 NOTE — PATIENT INSTRUCTIONS
"Recommendations from today's MTM visit:                                                      I recommend levofloxacin triple therapy x 14 days:  Omeprazole 40 mg twice daily  Levofloxacin 500 mg once daily  Amoxicillin 500 mg 2 capsules (1000 mg) by mouth twice daily  Wait to start Gingko Biloba after finishing antibiotics. Don't take the calcium carbonate antacid fruit chewable while on antibiotics.   Stop taking omeprazole 20 mg while you are on treatment for H. Pylori. We are having you do a higher dose (omeprazole 40 mg twice daily x 14 days). You can restart the omeprazole 20 mg after you finish testing to make sure the H. Pylori is gone.   Take acetaminophen (brand name: Tylenol) for mild aches and pain. Avoid NSAIDs, which include aspirin, ibuprofen (Advil, Motrin), and naproxen (Aleve, Naprosyn).   If you are having questions about your migraine treatment, please reach out to me and we can discuss medication options. You can then discuss that information with your neurologist.      Follow-up: 1 week check-in; follow-up testing at least 4 weeks after completion of antibiotics    It was great speaking with you today.  I value your experience and would be very thankful for your time in providing feedback in our clinic survey. In the next few days, you may receive an email or text message from [employer] with a link to a survey related to your  clinical pharmacist.\"     To schedule another MTM appointment, please call the clinic directly or you may call the MTM scheduling line at [phone] or toll-free at [phone].     My Clinical Pharmacist's contact information:                                                      Please feel free to contact me with any questions or concerns you have.      Too Martinez, PharmD, BCPS  MTM Pharmacist   [facility] Gastroenterology  Phone: [phone]   "

## 2023-11-06 ENCOUNTER — TELEPHONE (OUTPATIENT)
Dept: GASTROENTEROLOGY | Facility: CLINIC | Age: 23
End: 2023-11-06
Payer: COMMERCIAL

## 2023-11-06 NOTE — TELEPHONE ENCOUNTER
Attempted to contact Janeth to discuss her symptoms in more detail. Zulema Fontainehir has sent additional lab work up for her symptoms of blood in stool. Given that redness and warmth may be an early sign of levofloxacin hypersensitivity I recommend she stops taking medications at this time.    Aleks Martinez, PharmD, BCPS  MT Pharmacist   [facility] Gastroenterology  Phone: [phone]

## 2023-11-09 ENCOUNTER — LAB (OUTPATIENT)
Dept: LAB | Facility: CLINIC | Age: 23
End: 2023-11-09
Payer: COMMERCIAL

## 2023-11-09 DIAGNOSIS — K92.1 BLOOD IN STOOL: ICD-10-CM

## 2023-11-09 LAB
ALBUMIN SERPL BCG-MCNC: 4.3 G/DL (ref 3.5–5.2)
ALP SERPL-CCNC: 60 U/L (ref 35–104)
ALT SERPL W P-5'-P-CCNC: 5 U/L (ref 0–50)
ANION GAP SERPL CALCULATED.3IONS-SCNC: 12 MMOL/L (ref 7–15)
AST SERPL W P-5'-P-CCNC: 18 U/L (ref 0–45)
BASOPHILS # BLD AUTO: 0 10E3/UL (ref 0–0.2)
BASOPHILS NFR BLD AUTO: 0 %
BILIRUB SERPL-MCNC: 0.3 MG/DL
BUN SERPL-MCNC: 9.2 MG/DL (ref 6–20)
CALCIUM SERPL-MCNC: 9.7 MG/DL (ref 8.6–10)
CHLORIDE SERPL-SCNC: 106 MMOL/L (ref 98–107)
CREAT SERPL-MCNC: 0.81 MG/DL (ref 0.51–0.95)
CRP SERPL-MCNC: <3 MG/L
DEPRECATED HCO3 PLAS-SCNC: 22 MMOL/L (ref 22–29)
EGFRCR SERPLBLD CKD-EPI 2021: >90 ML/MIN/1.73M2
EOSINOPHIL # BLD AUTO: 0.1 10E3/UL (ref 0–0.7)
EOSINOPHIL NFR BLD AUTO: 2 %
ERYTHROCYTE [DISTWIDTH] IN BLOOD BY AUTOMATED COUNT: 12.4 % (ref 10–15)
ERYTHROCYTE [SEDIMENTATION RATE] IN BLOOD BY WESTERGREN METHOD: 5 MM/HR (ref 0–20)
GLUCOSE SERPL-MCNC: 108 MG/DL (ref 70–99)
HCT VFR BLD AUTO: 39.8 % (ref 35–47)
HGB BLD-MCNC: 13.2 G/DL (ref 11.7–15.7)
IMM GRANULOCYTES # BLD: 0 10E3/UL
IMM GRANULOCYTES NFR BLD: 0 %
LYMPHOCYTES # BLD AUTO: 1.7 10E3/UL (ref 0.8–5.3)
LYMPHOCYTES NFR BLD AUTO: 38 %
MCH RBC QN AUTO: 29 PG (ref 26.5–33)
MCHC RBC AUTO-ENTMCNC: 33.2 G/DL (ref 31.5–36.5)
MCV RBC AUTO: 88 FL (ref 78–100)
MONOCYTES # BLD AUTO: 0.4 10E3/UL (ref 0–1.3)
MONOCYTES NFR BLD AUTO: 8 %
NEUTROPHILS # BLD AUTO: 2.4 10E3/UL (ref 1.6–8.3)
NEUTROPHILS NFR BLD AUTO: 53 %
PLATELET # BLD AUTO: 253 10E3/UL (ref 150–450)
POTASSIUM SERPL-SCNC: 4.4 MMOL/L (ref 3.4–5.3)
PROT SERPL-MCNC: 7.2 G/DL (ref 6.4–8.3)
RBC # BLD AUTO: 4.55 10E6/UL (ref 3.8–5.2)
SODIUM SERPL-SCNC: 140 MMOL/L (ref 135–145)
WBC # BLD AUTO: 4.5 10E3/UL (ref 4–11)

## 2023-11-09 PROCEDURE — 85652 RBC SED RATE AUTOMATED: CPT

## 2023-11-09 PROCEDURE — 36415 COLL VENOUS BLD VENIPUNCTURE: CPT

## 2023-11-09 PROCEDURE — 85025 COMPLETE CBC W/AUTO DIFF WBC: CPT

## 2023-11-09 PROCEDURE — 86140 C-REACTIVE PROTEIN: CPT

## 2023-11-09 PROCEDURE — 80053 COMPREHEN METABOLIC PANEL: CPT

## 2023-11-13 PROCEDURE — 83993 ASSAY FOR CALPROTECTIN FECAL: CPT

## 2023-11-13 PROCEDURE — 87507 IADNA-DNA/RNA PROBE TQ 12-25: CPT

## 2023-11-13 PROCEDURE — 87493 C DIFF AMPLIFIED PROBE: CPT | Mod: 59

## 2023-11-15 LAB

## 2023-11-16 ENCOUNTER — VIRTUAL VISIT (OUTPATIENT)
Dept: NEUROLOGY | Facility: CLINIC | Age: 23
End: 2023-11-16
Payer: COMMERCIAL

## 2023-11-16 DIAGNOSIS — G43.009 MIGRAINE WITHOUT AURA AND WITHOUT STATUS MIGRAINOSUS, NOT INTRACTABLE: Primary | ICD-10-CM

## 2023-11-16 DIAGNOSIS — H93.A1 PULSATILE TINNITUS OF RIGHT EAR: ICD-10-CM

## 2023-11-16 DIAGNOSIS — H53.8 BLURRED VISION: ICD-10-CM

## 2023-11-16 DIAGNOSIS — G43.719 INTRACTABLE CHRONIC MIGRAINE WITHOUT AURA AND WITHOUT STATUS MIGRAINOSUS: ICD-10-CM

## 2023-11-16 PROCEDURE — 99213 OFFICE O/P EST LOW 20 MIN: CPT | Mod: 95 | Performed by: NURSE PRACTITIONER

## 2023-11-16 RX ORDER — METOCLOPRAMIDE 5 MG/1
5 TABLET ORAL EVERY 6 HOURS PRN
Qty: 20 TABLET | Refills: 3 | Status: SHIPPED | OUTPATIENT
Start: 2023-11-16

## 2023-11-16 NOTE — NURSING NOTE
Is the patient currently in the state of [place]? YES    Visit mode:VIDEO    If the visit is dropped, the patient can be reconnected by: VIDEO VISIT: Send to e-mail at: [email or web address]    Will anyone else be joining the visit? NO  (If patient encounters technical issues they should call [phone]    How would you like to obtain your AVS? MyChart    Are changes needed to the allergy or medication list? No    Reason for visit: MAYRA PHILLIPS

## 2023-11-16 NOTE — PATIENT INSTRUCTIONS
Plan:  Would recommend to optimize headache prevention   Recommended a trial of migraine preventive treatment with Emgality. Side effects-allergic reaction or pain in the injection side or redness in the injection side. Unknown side effects with a long term use. Pregnancy is not advised -would need to stop 3 months before thinking of pregnancy  Rescue treatment -a trial of Ubrelvy as needed.   Acetaminophen a gram every 6 hours as needed +metoclopramide +benadryl for severe migraine as needed   Follow up in 3 months or sooner if needed     Pulsatile  tinnitus right side and getting worse-feels heart beat in her ears.   Brain MRI, MRA and MRV for any structural problems   Get updated eye exam

## 2023-11-16 NOTE — LETTER
11/16/2023       RE: Janeth Parra  [address]   [facility] [address]     Dear Colleague,    Thank you for referring your patient, Janeth Parra, to the [facility] [place] at [facility]. Please see a copy of my visit note below.    Janeth is a 23 year old who is being evaluated via a billable video visit.    Subjective  Janeth is a 23 year old, presenting for the following health issues:headache   RECHECK  Headaches 30 days out of 30 days and all 30 days are severe headaches   Patient doing really bad because pain is severe. Change her visit from in person to video. Camera does not work.   Patient was taking advil and stopped it due to GI symptoms Oct 24th. Headache is severe and severe pain and throbbing temples and ringing like a heart beat -the pace as heart beat.   Sometimes right eye blurry-last eye exam in 2022.   Worsening of headaches in the past couple of month and from October -worse.   Amitriptyline was no difference after 2 months-stopped late August.    Sumatriptan -did not do anything after a lot of trials  Metoclopromide stopped-was not feeling nauseous lately   Was started on topiramate and was on it for about 2 weeks as well.    rizatriptan daily and was not aware its for rescue -did not help     Brain MRI 9/6/2022 was negative than -report from [facility] reviewed    Plan:  Would recommend to optimize headache prevention   Recommended a trial of migraine preventive treatment with Emgality. Side effects-allergic reaction or pain in the injection side or redness in the injection side. Unknown side effects with a long term use. Pregnancy is not advised -would need to stop 3 months before thinking of pregnancy  Rescue treatment -a trial of Ubrelvy as needed.   Acetaminophen a gram every 6 hours as needed +metoclopramide +benadryl for severe migraine as needed   Follow up in 3 months or sooner if needed      Pulsatile  tinnitus right side and getting worse-feels heart beat in her ears.   Plan-recommended Brain MRI, MRA and MRV for any structural problems   Get updated eye exam     Objective   Vitals:  No vitals were obtained today due to virtual visit.  Headache about 10/10   Physical Exam   GENERAL: Healthy, alert and no distress  EYES: Eyes grossly normal to inspection.RESP: No audible wheeze, cough, or visible cyanosis.  No visible retractions or increased work of breathing.    NEURO: Cranial nerves grossly intact.  Mentation and speech appropriate for age.  PSYCH: Mentation appears normal, affect normal, judgement and insight intact, normal speech and appearance well-groomed.    I discussed all my recommendations with Janeth Parra who verbalizes understanding and comfortable with the plan.      26 minutes spent on the date of the encounter doing video access, chart  review,  meds review, treatment plan, documentation and further activities as noted above        Again, thank you for allowing me to participate in the care of your patient.      Sincerely,    OBDULIA Holland CNP

## 2023-11-16 NOTE — PROGRESS NOTES
Janeth is a 23 year old who is being evaluated via a billable video visit.    Subjective   Janeth is a 23 year old, presenting for the following health issues:headache   RECHECK  Headaches 30 days out of 30 days and all 30 days are severe headaches   Patient doing really bad because pain is severe. Change her visit from in person to video. Camera does not work.   Patient was taking advil and stopped it due to GI symptoms Oct 24th. Headache is severe and severe pain and throbbing temples and ringing like a heart beat -the pace as heart beat.   Sometimes right eye blurry-last eye exam in 2022.   Worsening of headaches in the past couple of month and from October -worse.   Amitriptyline was no difference after 2 months-stopped late August.    Sumatriptan -did not do anything after a lot of trials  Metoclopromide stopped-was not feeling nauseous lately   Was started on topiramate and was on it for about 2 weeks as well.    rizatriptan daily and was not aware its for rescue -did not help     Brain MRI 9/6/2022 was negative than -report from [facility] reviewed    Plan:  Would recommend to optimize headache prevention   Recommended a trial of migraine preventive treatment with Emgality. Side effects-allergic reaction or pain in the injection side or redness in the injection side. Unknown side effects with a long term use. Pregnancy is not advised -would need to stop 3 months before thinking of pregnancy  Rescue treatment -a trial of Ubrelvy as needed.   Acetaminophen a gram every 6 hours as needed +metoclopramide +benadryl for severe migraine as needed   Follow up in 3 months or sooner if needed     Pulsatile  tinnitus right side and getting worse-feels heart beat in her ears.   Plan-recommended Brain MRI, MRA and MRV for any structural problems   Get updated eye exam     Objective    Vitals:  No vitals were obtained today due to virtual visit.  Headache about 10/10   Physical Exam   GENERAL: Healthy, alert and no  distress  EYES: Eyes grossly normal to inspection.RESP: No audible wheeze, cough, or visible cyanosis.  No visible retractions or increased work of breathing.    NEURO: Cranial nerves grossly intact.  Mentation and speech appropriate for age.  PSYCH: Mentation appears normal, affect normal, judgement and insight intact, normal speech and appearance well-groomed.    I discussed all my recommendations with Janeth Parra who verbalizes understanding and comfortable with the plan.      26 minutes spent on the date of the encounter doing video access, chart  review,  meds review, treatment plan, documentation and further activities as noted above    OBDULIA Francisco, CNP [facility]  Headache certified  [facility]      Video-Visit Details    Type of service:  Video Visit   Originating Location (pt. Location): Home  Distant Location (provider location):  On-site  Platform used for Video Visit: Karan

## 2023-11-17 ENCOUNTER — TELEPHONE (OUTPATIENT)
Dept: NEUROLOGY | Facility: CLINIC | Age: 23
End: 2023-11-17
Payer: COMMERCIAL

## 2023-11-17 ENCOUNTER — TELEPHONE (OUTPATIENT)
Dept: OPHTHALMOLOGY | Facility: CLINIC | Age: 23
End: 2023-11-17
Payer: COMMERCIAL

## 2023-11-17 ENCOUNTER — NURSE TRIAGE (OUTPATIENT)
Dept: NURSING | Facility: CLINIC | Age: 23
End: 2023-11-17
Payer: COMMERCIAL

## 2023-11-17 LAB — CALPROTECTIN STL-MCNT: 16.5 MG/KG (ref 0–49.9)

## 2023-11-17 NOTE — TELEPHONE ENCOUNTER
Per review of message symptoms on and off for 9 months.    Ok for first available with optometry/ophthalmology in open new time slot.    Note to patient communicator to assist in scheduling    If patient communicator indicates having any acute vision changes/eye symptoms-- may direct back to triage.    Gunnar José RN 10:54 AM 11/17/23

## 2023-11-17 NOTE — TELEPHONE ENCOUNTER
Nurse Triage SBAR    Is this a 2nd Level Triage? YES, LICENSED PRACTITIONER REVIEW IS REQUIRED    Situation: Intermittent blurred vision with migraines/headaches    Background: Patient states for the last 9 months or so she has intermittent episodes of blurred vision in her right eye only. Happens several times per month. Will develop a migraine with it. Also states there is a film over the pupil that she sees at times. Rates headache 10/10    Assessment: Blurred vision    Protocol Recommended Disposition:   See in Office Within 3 Days    Recommendation: Please call patient back with recommendations/appointment at [phone]     Routed to provider/team    Mia Garcia RN    Reason for Disposition   Brief (now gone) blurred vision and unexplained    Additional Information   Negative: Weakness of the face, arm or leg on one side of the body   Negative: Followed getting substance in the eye   Negative: Foreign body stuck in the eye   Negative: Followed an eye injury   Negative: Followed sun lamp or sun exposure (UV keratitis)   Negative: Yellow or green discharge (pus) in the eye   Negative: Pregnant   Negative: Complete loss of vision in one or both eyes   Negative: SEVERE eye pain   Negative: SEVERE headache   Negative: Double vision   Negative: Blurred vision or visual changes and present now and sudden onset or new (e.g., minutes, hours, days)  (Exception: Seeing floaters / black specks OR previously diagnosed migraine headaches with same symptoms.)   Negative: Patient sounds very sick or weak to the triager   Negative: Flashes of light  (Exception: Brief from pressing on the eyeball.)   Negative: Many floaters in the eye (Exception: Floater(s) are a chronic symptom and this is unchanged from patient's baseline pattern.)   Negative: Eye pain and brief (now gone) blurred vision or visual changes   Negative: Taking digoxin (e.g., Lanoxin, Digitek, Cardoxin, Lanoxicaps; Toloxin in [place]) and blurred vision,  "yellow vision, or yellow-green halos   Negative: Jaw pain while eating and age > 50 years   Negative: Headache and age > 50 years   Negative: Patient wants to be seen    Answer Assessment - Initial Assessment Questions  1. DESCRIPTION: \"How has your vision changed?\" (e.g., complete vision loss, blurred vision, double vision, floaters, etc.)      9 months  2. LOCATION: \"One or both eyes?\" If one, ask: \"Which eye?\"      RIGHT EYE  3. SEVERITY: \"Can you see anything?\" If Yes, ask: \"What can you see?\" (e.g., fine print)      Can see everything ok  4. ONSET: \"When did this begin?\" \"Did it start suddenly or has this been gradual?\"      Not sure  5. PATTERN: \"Does this come and go, or has it been constant since it started?\"      Comes and goes  6. PAIN: \"Is there any pain in your eye(s)?\"  (Scale 1-10; or mild, moderate, severe)    - NONE (0): No pain.    - MILD (1-3): Doesn't interfere with normal activities.    - MODERATE (4-7): Interferes with normal activities or awakens from sleep.     - SEVERE (8-10): Excruciating pain, unable to do any normal activities.      none  7. CONTACTS-GLASSES: \"Do you wear contacts or glasses?\"      no  8. CAUSE: \"What do you think is causing this visual problem?\"      Not sure, maybe migraines  9. OTHER SYMPTOMS: \"Do you have any other symptoms?\" (e.g., confusion, headache, arm or leg weakness, speech problems)      Headache. Dizziness, some sort of film over pupil that moves around. Sensitive to light and sound  10. PREGNANCY: \"Is there any chance you are pregnant?\" \"When was your last menstrual period?\"        no    Protocols used: Vision Loss or Change-A-OH    "

## 2023-11-17 NOTE — TELEPHONE ENCOUNTER
Called and spoke to Janeth - spoke to english     Made an appointment for 11/21 @ 930am with Dr. Ann     Discussed     Wait time     Billing / insurance     Clinic address     [facility]       Explained insurance -     Hyacinth Bunn Communication Facilitator on 11/17/2023 at 12:22 PM

## 2023-11-17 NOTE — TELEPHONE ENCOUNTER
Prior Authorization Retail Medication Request    Medication/Dose: galcanezumab-gnlm (EMGALITY) 120 MG/ML injection   Diagnosis and ICD code (if different than what is on RX):  G43.719  New/renewal/insurance change PA/secondary ins. PA:    Previously Tried and Failed:  See Chart  Rationale:  See chart    Insurance:   [facility]  Insurance ID:  [id number]      Pharmacy Information (if different than what is on RX)  Name:    Phone:    Fax:

## 2023-11-17 NOTE — TELEPHONE ENCOUNTER
Prior Authorization Retail Medication Request    Medication/Dose: ubrogepant (UBRELVY) 50 MG tablet   Diagnosis and ICD code (if different than what is on RX): G43.009   New/renewal/insurance change PA/secondary ins. PA:  Previously Tried and Failed:    Worsening of headaches in the past couple of month and from October -worse.   Amitriptyline was no difference after 2 months-stopped late August.    Sumatriptan -did not do anything after a lot of trials  Metoclopromide stopped-was not feeling nauseous lately   Was started on topiramate and was on it for about 2 weeks as well.    rizatriptan daily and was not aware its for rescue -did not help   Advil - stopped d/t GI symptoms    Insurance   Primary: [facility]  Insurance ID:  [id number]      Pharmacy Information (if different than what is on RX)  Name:    Phone:    Fax:

## 2023-11-21 NOTE — TELEPHONE ENCOUNTER
Central Prior Authorization Team  Phone: [phone]    PA Initiation    Medication: EMGALITY 120 MG/ML SC SOAJ  Insurance Company: [employer] Non-Specialty PA's - Phone [phone] Fax [phone]  Pharmacy Filling the Rx: [id number] - [place], [place] - [address] [place] [address]  Filling Pharmacy Phone: [phone]  Filling Pharmacy Fax:    Start Date: 11/21/2023

## 2023-11-21 NOTE — TELEPHONE ENCOUNTER
PA Initiation    Medication: UBRELVY 50 MG PO TABS  Insurance Company: [employer] Non-Specialty PA's - Phone [phone] Fax [phone]  Pharmacy Filling the Rx: [facility] #[id number] [address]  Filling Pharmacy Phone: [phone]  Filling Pharmacy Fax:    Start Date: 11/21/2023

## 2023-11-21 NOTE — TELEPHONE ENCOUNTER
Prior Authorization Approval    Medication: UBRELVY 50 MG PO TABS  Authorization Effective Date: 10/22/2023  Authorization Expiration Date: 11/20/2024  Approved Dose/Quantity: 16/30  Reference #: [id number]   Insurance Company: [employer] Non-Specialty PA's - Phone [phone] Fax [phone]  Expected CoPay: $    CoPay Card Available:      Financial Assistance Needed:   Which Pharmacy is filling the prescription: [facility] #[id number] - [address]  Pharmacy Notified: Yes  Patient Notified: Yes

## 2023-11-21 NOTE — TELEPHONE ENCOUNTER
Prior Authorization Approval    Medication: EMGALITY 120 MG/ML SC SOAJ  Authorization Effective Date: 10/22/2023  Authorization Expiration Date: 11/20/2024  Approved Dose/Quantity:   Reference #:     Insurance Company: [employer] Non-Specialty PA's - Phone [phone] Fax [phone]  Expected CoPay: $    CoPay Card Available:      Financial Assistance Needed:   Which Pharmacy is filling the prescription: [facility] #[id number] - [address]  Pharmacy Notified: Yes  Patient Notified: **Instructed pharmacy to notify patient when script is ready to /ship.**

## 2023-11-27 ENCOUNTER — TELEPHONE (OUTPATIENT)
Dept: GASTROENTEROLOGY | Facility: CLINIC | Age: 23
End: 2023-11-27
Payer: COMMERCIAL

## 2023-11-27 ENCOUNTER — VIRTUAL VISIT (OUTPATIENT)
Dept: GASTROENTEROLOGY | Facility: CLINIC | Age: 23
End: 2023-11-27
Attending: PHYSICIAN ASSISTANT
Payer: COMMERCIAL

## 2023-11-27 DIAGNOSIS — A04.8 H. PYLORI INFECTION: Primary | ICD-10-CM

## 2023-11-27 DIAGNOSIS — G43.009 MIGRAINE WITHOUT AURA AND WITHOUT STATUS MIGRAINOSUS, NOT INTRACTABLE: ICD-10-CM

## 2023-11-27 RX ORDER — AMOXICILLIN 500 MG/1
1000 CAPSULE ORAL 3 TIMES DAILY
Qty: 84 CAPSULE | Refills: 0 | Status: SHIPPED | OUTPATIENT
Start: 2023-11-27

## 2023-11-27 RX ORDER — RABEPRAZOLE SODIUM 20 MG/1
20 TABLET, DELAYED RELEASE ORAL 3 TIMES DAILY
Qty: 42 TABLET | Refills: 0 | Status: CANCELLED | OUTPATIENT
Start: 2023-11-27

## 2023-11-27 NOTE — PATIENT INSTRUCTIONS
"Recommendations from today's MTM visit:                                                      I recommend treatment with high dose dual therapy:  Amoxicillin 1000 mg three times daily  Omeprazole 20 mg three times daily  We will test to make sure that the H. Pylori is gone no sooner than 4 weeks after completion of your antibiotic therapy. You will want to be off of PPI for at least 1-2 weeks prior to stool testing as this can make the test inaccurate.    Follow-up: 1 week check-in; eradication testing at least 4 weeks after completion of antibiotics.     It was great speaking with you today.  I value your experience and would be very thankful for your time in providing feedback in our clinic survey. In the next few days, you may receive an email or text message from [employer] with a link to a survey related to your  clinical pharmacist.\"     To schedule another MTM appointment, please call the clinic directly or you may call the MTM scheduling line at [phone] or toll-free at [phone].     My Clinical Pharmacist's contact information:                                                      Please feel free to contact me with any questions or concerns you have.      Too Martinez, PharmD, BCPS  MTM Pharmacist   [facility]  Phone: [phone]   "

## 2023-11-27 NOTE — PROGRESS NOTES
Medication Therapy Management (MTM) Encounter    ASSESSMENT:                            Medication Adherence/Access: No issues identified    H. Pylori:  Janeth would benefit from salvage therapy. Given that she was only able to tolerate 7 days of her levofloxacin triple therapy and is still symptomatic, we will treat her with another regimen now rather than waiting for results of eradication testing. We will try high dose dual therapy as she has not used this regimen in the past and H. Pylori has a low risk of developing amoxicillin resistance from previous treatments. She will be indicated for eradication testing at least 4 weeks after completion of antibiotics. She will want to be off of PPI for at least 1-2 weeks prior to eradication testing. Future considerations include rifabutin triple therapy or culture and sensitivity testing.     PLAN:                            I recommend treatment with high dose dual therapy:  Amoxicillin 1000 mg three times daily  Omeprazole 20 mg three times daily  We will test to make sure that the H. Pylori is gone no sooner than 4 weeks after completion of your antibiotic therapy. You will want to be off of PPI for at least 1-2 weeks prior to stool testing as this can make the test inaccurate.    Follow-up: 1 week check-in; eradication testing at least 4 weeks after completion of antibiotics.     SUBJECTIVE/OBJECTIVE:                          Janeth Parra is a 23 year old female called for a follow-up visit from 10/24/23.       Reason for visit: H. Pylori salvage therapy.    Allergies/ADRs: Reviewed in chart  Past Medical History: Reviewed in chart  Tobacco: She reports that she has never smoked. She has never used smokeless tobacco.  Alcohol: none      Medication Adherence/Access: no issues reported    H. Pylori:     Janeth seen today for follow-up visit for H. Pylori. At last visit 10/24/23 she was prescribed levofloxacin triple therapy as a third line salvage regimen but had to  discontinue it after 7 of 14 days due to suspected intolerance to the levofloxacin.     She reports she is having bloating issues.      Most recent positive H. Pylori stool antigen test 5/21/23. Reports that her family members have similar GI symptoms. Testing household members has been previously encouraged. No negative H. Pylori stool antigen tests are on file.         Previous macrolide exposure: unclear   Penicillin allergy: no  Pregnant/breastfeeding: No   No chronic liver disease; liver enzymes normal 5/6/22  Aortic aneurysm: no  History of tendon rupture: no  Tolerated amoxicillin in past: yes     GFR Estimate   Date Value Ref Range Status   11/09/2023 >90 >60 mL/min/1.73m2 Final   04/05/2016 118.6 mL/min/1.7 m2 Final     GFR Estimate If Black   Date Value Ref Range Status   04/05/2016 143.6 mL/min/1.7 m2 Final     Previous treatment regimens:   10/7/2020: amoxicillin 1000 mg twice daily x 14 days, metronidazole 500 mg twice daily x 14 days, omeprazole 20 mg twice daily x 14 days     5/10/22: bismuth subsalicylate 262 mg four times daily x 14 days, metronidazole 250 mg four times daily x 14 days, tetracycline 500 mg four times daily x 14 days, omeprazole 20 mg twice daily x 14 days    10/24/23: levofloxacin triple therapy (stopped early due to suspected reaction) started October 27th, finished 7/14 days. Levofloxacin 500 mg once daily, omeprazole 40 mg twice daily, amoxicillin 1000 mg twice daily    Migraines:  Metoclopramide- never picked up  Ubrelvy  mg as needed   Emgality injection every 28 days (has only done the 240 mg first injection so far)    She is still having migraines but she is following closely with Neurology. Ubrelvy script indicates max 4 tabs in 4 hours which exceeds FDA max dose.     ----------------      I spent 11 minutes with this patient today. All changes were made via collaborative practice agreement with Zulema Ramos PA-C. A copy of the visit note was provided to the patient's  provider(s).    A summary of these recommendations was sent via [employer].    Too Martinez, PharmD, BCPS  MT Pharmacist   [facility]  Phone: [phone]    Telemedicine Visit Details  Type of service:  Telephone visit  Start Time:  11:30 AM  End Time:  11:41 AM     Medication Therapy Recommendations  No medication therapy recommendations to display

## 2023-11-27 NOTE — TELEPHONE ENCOUNTER
[facility] Call Center    Phone Message    May a detailed message be left on voicemail: yes     Reason for Call: Other: Pharmacy wants to make sure RX Amoxicillin and RX Omeprazole are the only 2 meds prescribed?  Typically patients get a third RX for H.Pylori  and they want to make sure nothing was missed.  Please call asap to confirm.     Action Taken: Message routed to:  [facility] GI    Travel Screening: Not Applicable

## 2023-11-27 NOTE — TELEPHONE ENCOUNTER
Spoke with [facility] pharmacist to clarify treatment was prescribed as high dose dual therapy/salvage therapy. Amoxicillin 1000 mg three times daily and Omeprazole 20 mg three times daily. Reports understanding and appreciation for call back.

## 2023-12-08 DIAGNOSIS — A04.8 H. PYLORI INFECTION: Primary | ICD-10-CM

## 2023-12-08 NOTE — PROGRESS NOTES
Janeth started H. Pylori antibiotics on 12/3. On track to complete 12/17. Due for H. Pylori eradication testing no sooner than 1/14/24. Order placed with this encounter. Reminder provided to be off of PPIs for 1-2 weeks prior to testing was given in separate [employer] message.    Too Martinez, PharmD, BCPS  MTM Pharmacist   [facility]  Phone: [phone]

## 2023-12-21 ENCOUNTER — HOSPITAL ENCOUNTER (OUTPATIENT)
Dept: MRI IMAGING | Facility: CLINIC | Age: 23
Discharge: HOME | End: 2023-12-21
Attending: NURSE PRACTITIONER
Payer: COMMERCIAL

## 2023-12-21 DIAGNOSIS — H93.A1 PULSATILE TINNITUS OF RIGHT EAR: ICD-10-CM

## 2023-12-21 PROCEDURE — 70546 MR ANGIOGRAPH HEAD W/O&W/DYE: CPT | Mod: 26 | Performed by: RADIOLOGY

## 2023-12-21 PROCEDURE — 255N000002 HC RX 255 OP 636: Mod: JZ | Performed by: NURSE PRACTITIONER

## 2023-12-21 PROCEDURE — 70553 MRI BRAIN STEM W/O & W/DYE: CPT | Mod: 26 | Performed by: RADIOLOGY

## 2023-12-21 PROCEDURE — A9585 GADOBUTROL INJECTION: HCPCS | Mod: JZ | Performed by: NURSE PRACTITIONER

## 2023-12-21 PROCEDURE — 70544 MR ANGIOGRAPHY HEAD W/O DYE: CPT

## 2023-12-21 PROCEDURE — 70553 MRI BRAIN STEM W/O & W/DYE: CPT

## 2023-12-21 PROCEDURE — 70546 MR ANGIOGRAPH HEAD W/O&W/DYE: CPT

## 2023-12-21 RX ORDER — GADOBUTROL 604.72 MG/ML
7.5 INJECTION INTRAVENOUS ONCE
Status: COMPLETED | OUTPATIENT
Start: 2023-12-21 | End: 2023-12-21

## 2023-12-21 RX ADMIN — GADOBUTROL 6 ML: 604.72 INJECTION INTRAVENOUS at 14:12

## 2023-12-21 NOTE — RESULT ENCOUNTER NOTE
Karlene Fowler,   All of your recent brain MRI images unchanged since September of 2022. No new acute abnormalities to expalin your symptoms. Follow up as discussed.   Take care,  Jo Ann

## 2024-01-18 ENCOUNTER — LAB (OUTPATIENT)
Dept: LAB | Facility: CLINIC | Age: 24
End: 2024-01-18
Payer: COMMERCIAL

## 2024-01-18 DIAGNOSIS — A04.8 H. PYLORI INFECTION: ICD-10-CM

## 2024-02-05 PROCEDURE — 87338 HPYLORI STOOL AG IA: CPT

## 2024-02-07 LAB — H PYLORI AG STL QL IA: NEGATIVE

## 2024-02-28 ENCOUNTER — VIRTUAL VISIT (OUTPATIENT)
Dept: NEUROLOGY | Facility: CLINIC | Age: 24
End: 2024-02-28
Payer: COMMERCIAL

## 2024-02-28 VITALS — HEIGHT: 66 IN | WEIGHT: 130 LBS | BODY MASS INDEX: 20.89 KG/M2

## 2024-02-28 DIAGNOSIS — F41.9 ANXIETY: ICD-10-CM

## 2024-02-28 DIAGNOSIS — H93.A1 PULSATILE TINNITUS OF RIGHT EAR: Primary | ICD-10-CM

## 2024-02-28 DIAGNOSIS — R42 DIZZINESS: ICD-10-CM

## 2024-02-28 DIAGNOSIS — R51.9 CHRONIC DAILY HEADACHE: ICD-10-CM

## 2024-02-28 PROCEDURE — 99215 OFFICE O/P EST HI 40 MIN: CPT | Mod: 95 | Performed by: NURSE PRACTITIONER

## 2024-02-28 ASSESSMENT — HEADACHE IMPACT TEST (HIT 6)
WHEN YOU HAVE HEADACHES HOW OFTEN IS THE PAIN SEVERE: ALWAYS
WHEN YOU HAVE A HEADACHE HOW OFTEN DO YOU WISH YOU COULD LIE DOWN: ALWAYS
HOW OFTEN DID HEADACHS LIMIT CONCENTRATION ON WORK OR DAILY ACTIVITY: ALWAYS
HOW OFTEN DID HEADACHS LIMIT CONCENTRATION ON WORK OR DAILY ACTIVITY: ALWAYS
HIT6 TOTAL SCORE: 78
WHEN YOU HAVE A HEADACHE HOW OFTEN DO YOU WISH YOU COULD LIE DOWN: ALWAYS
HOW OFTEN HAVE YOU FELT TOO TIRED TO WORK BECAUSE OF YOUR HEADACHES: ALWAYS
HOW OFTEN HAVE YOU FELT TOO TIRED TO WORK BECAUSE OF YOUR HEADACHES: ALWAYS
HOW OFTEN HAVE YOU FELT FED UP OR IRRITATED BECAUSE OF YOUR HEADACHES: ALWAYS
HIT6 TOTAL SCORE: 78
HOW OFTEN DO HEADACHES LIMIT YOUR DAILY ACTIVITIES: ALWAYS
HOW OFTEN HAVE YOU FELT FED UP OR IRRITATED BECAUSE OF YOUR HEADACHES: ALWAYS
WHEN YOU HAVE HEADACHES HOW OFTEN IS THE PAIN SEVERE: ALWAYS
HOW OFTEN DO HEADACHES LIMIT YOUR DAILY ACTIVITIES: ALWAYS

## 2024-02-28 ASSESSMENT — PAIN SCALES - GENERAL: PAINLEVEL: WORST PAIN (10)

## 2024-02-28 ASSESSMENT — MIGRAINE DISABILITY ASSESSMENT (MIDAS)
ON A SCALE FROM 0-10 ON AVERAGE HOW PAINFUL WERE HEADACHES: 10
HOW MANY DAYS DID YOU NOT DO HOUSEWORK BECAUSE OF HEADACHES: 90
HOW OFTEN WERE SOCIAL ACTIVITIES MISSED DUE TO HEADACHES: 0
HOW MANY DAYS WAS HOUSEWORK PRODUCTIVITY CUT IN HALF DUE TO HEADACHES: 0
HOW MANY DAYS WAS YOUR PRODUCTIVITY CUT IN HALF BECAUSE OF HEADACHES: 0
HOW MANY DAYS IN THE PAST 3 MONTHS HAVE YOU HAD A HEADACHE: 90
TOTAL SCORE: 90
HOW MANY DAYS DID YOU MISS WORK OR SCHOOL BECAUSE OF HEADACHES: 0

## 2024-02-28 NOTE — NURSING NOTE
Is the patient currently in the state of [place]? YES    Visit mode:VIDEO    If the visit is dropped, the patient can be reconnected by: VIDEO VISIT: Text to cell phone:   Telephone Information:   Mobile [phone]       Will anyone else be joining the visit? NO  (If patient encounters technical issues they should call [phone]    How would you like to obtain your AVS? MyChart    Are changes needed to the allergy or medication list? No    Reason for visit: Follow Up    Marietta PHILLIPS

## 2024-02-28 NOTE — LETTER
2/28/2024       RE: Janeth Parra  [address]   [place] [address]     Dear Colleague,    Thank you for referring your patient, Janeth Parra, to the [facility] [place] at [facility]. Please see a copy of my visit note below.    Janeth is a 24 year old who is being evaluated via a billable video visit.        Subjective  Janeth is a 24 year old, presenting for the following health issues:headache   Follow Up    Started Emgality in Nov 2023. Still has headaches and ringing getting worse. Headaches every day when ringing is bed. Gets dizzy and cannot get up. Turning to the side and bending over causes worsening of headaches. Noise sensitivity -the most and smell sensitivity. Headache startes on the right side and moves to the other side, temples hurting as well. Right ear ringing and pain like something is pocking -like a sharp pain in the there.   Has been taking ubrelvy -helps with headaches but ringing persist and more bothering now.   Missing school and cannot get out of bed because the ringing in the ears, headaches and dizziness.   Eye exam recently and was good  Patient has seen Dr Lewis at [facility] on June 1st for a second opinion. Was started on topiramate and was on it for about 2 weeks as well.   Saw different ENTs-Dr Simmons [facility] in July of 2023 for right hearing and tinnitus.  Patient has a history of right asymmetric sensorineural hearing loss.  MRI was negative.   Denies history of anxiety/depression history     Headache Tx  Amitriptyline, topiramate, metoclopromide, sumatriptan,   Does not take OTC medications     Impression:  Chronic migraines  Right ear and now left ear -ringing 24/7 and sharp pain in the ears and feels like a heart beat in the ears.   Return to ENT for follow up for persistent  ringing     Plan:  Continue Emgality for prevention  Rescue treatment -ubrelvy as needed   CTV  neck and head to evaluate for venous compression-worsening ringing and dizziness with turning head side to side or bending over   PT for dizziness, vestibular   Referral to TMD clinic for evaluation   Mental health referral for chronic daily headaches, tinnitus, anxiety due to ringing to help with coping   Follow up in 3 months or sooner if needed       DATA :reviewed  MR BRAIN W/O & W CONTRAST, MRV BRAIN  W/O & W CONTRAST,  MRA BRAIN ([place] [facility]) W/O CONTRAST 12/21/2023 3:17 PM     Provided History:  Tinnitus  ICD-10: Pulsatile tinnitus of right ear     Comparison: Outside brain MR 9/6/2022.     Technique:   Brain MRI: Axial diffusion-weighted with ADC map, T2-weighted,  turboFLAIR and T1-weighted images of the brain and axial T1-weighted  and coronal T2-weighted with fat saturation images centered on the  internal auditory canals were obtained without intravenous contrast.  Following intravenous administration of gadolinium, axial turboFLAIR  images of the brain and axial T1-weighted with fat saturation and  coronal T1-weighted images centered on the internal auditory canals  were obtained.     MRA Head:  Using a 3D time-of-flight image acquisition technique, MRA  of the major arteries at the base of the brain was obtained without  intravenous contrast. Three-dimensional reconstructions of the head  MRA were created, which were reviewed by the radiologist.     Head MRV: 2D time-of-flight MR venogram (MRV) of the head was  performed without intravenous contrast. Following intravenous  gadolinium-based contrast administration, a contrast enhanced MRV of  the intracranial vessels was performed.     Contrast: 6mL Gadavist     Findings:   Brain MRI:  Compared to outside brain MR 9/6/2022, there is unchanged arterial  vascular abutment of the distal right AICA with the cisternal portion  of the right cochlear nerve as seen on series 18, image 21, series 17,  image 33 and series 13, image 42.     No abnormal signal  or enhancement along the course of the seventh and  eighth cranial nerves on either side. Vestibular and auditory  structures exhibit normal signal on T2-weighted images and no abnormal  enhancement.     Images of the whole brain demonstrate no mass lesion, no mass effect,  or midline shift. No intracranial hemorrhage on  susceptibility-weighted images. There is no restricted diffusion.  Ventricles are proportionate to the cerebral sulci. No abnormal  enhancement.     Head MRA:  No significant stenosis or aneurysmal dilation of the major  intracranial arteries. Fenestrated anterior communicating artery is  patent. The left posterior communicating artery is patent. The right  posterior communicating artery is not well visualized.     Head MRV:  Major intracranial superficial and deep venous sinuses are patent.                                                                      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  1.  Head MRA: Major intracranial arteries are patent without  significant stenosis or aneurysm.  2.  Head MRV: Major intracranial superficial and deep venous sinuses  are patent.     I have personally reviewed the examination and initial interpretation  and I agree with the findings.     MIN MORA MD           Objective   Vitals - Patient Reported  Pain Score: Worst Pain (10)  Pain Loc: Head (ear pain, ringing in ear)    Physical Exam   Patient is alert and no in apparent acute distress,  mentation appears normal, judgement and insight intact, normal speech, no weakness observed    I discussed all my recommendations with Janeth Parra who verbalizes understanding and comfortable with the plan.     41 minutes spent on the date of the encounter doing video access, chart  review,  meds review, treatment plan, documentation and further activities as noted  above    OBDULIA Francisco, CNP [facility]  Headache certified  [facility]    Video-Visit Details    Type of service:  Video Visit   Originating Location (pt. Location): Home  Distant Location (provider location):  Off-site  Platform used for Video Visit: Kraan      Again, thank you for allowing me to participate in the care of your patient.      Sincerely,    OBDULIA Holland CNP

## 2024-02-28 NOTE — PATIENT INSTRUCTIONS
Plan:  Continue Emgality for prevention  Rescue treatment -ubrelvy as needed   CTV neck and head to evaluate for venous compression-worsening ringing and dizziness with turning head side to side or bending over   PT for dizziness, vestibular   Referral to TMD clinic for evaluation   Mental health referral for chronic daily headaches, tinnitus, anxiety due to ringing to help with coping   Follow up in 3 months or sooner if needed

## 2024-02-28 NOTE — PROGRESS NOTES
Janeth is a 24 year old who is being evaluated via a billable video visit.        Subjective   Janeth is a 24 year old, presenting for the following health issues:headache   Follow Up    Started Emgality in Nov 2023. Still has headaches and ringing getting worse. Headaches every day when ringing is bed. Gets dizzy and cannot get up. Turning to the side and bending over causes worsening of headaches. Noise sensitivity -the most and smell sensitivity. Headache startes on the right side and moves to the other side, temples hurting as well. Right ear ringing and pain like something is pocking -like a sharp pain in the there.   Has been taking ubrelvy -helps with headaches but ringing persist and more bothering now.   Missing school and cannot get out of bed because the ringing in the ears, headaches and dizziness.   Eye exam recently and was good  Patient has seen Dr Lewis at [facility] on June 1st for a second opinion. Was started on topiramate and was on it for about 2 weeks as well.   Saw different ENTs-Dr Simmons [facility] in July of 2023 for right hearing and tinnitus.  Patient has a history of right asymmetric sensorineural hearing loss.  MRI was negative.   Denies history of anxiety/depression history     Headache Tx  Amitriptyline, topiramate, metoclopromide, sumatriptan,   Does not take OTC medications     Impression:  Chronic migraines  Right ear and now left ear -ringing 24/7 and sharp pain in the ears and feels like a heart beat in the ears.   Return to ENT for follow up for persistent  ringing     Plan:  Continue Emgality for prevention  Rescue treatment -ubrelvy as needed   CTV neck and head to evaluate for venous compression-worsening ringing and dizziness with turning head side to side or bending over   PT for dizziness, vestibular   Referral to TMD clinic for evaluation   Mental health referral for chronic daily headaches, tinnitus, anxiety due to ringing to help with coping   Follow up in 3  months or sooner if needed       DATA :reviewed  MR BRAIN W/O & W CONTRAST, MRV BRAIN  W/O & W CONTRAST,  MRA BRAIN ([place] [facility]) W/O CONTRAST 12/21/2023 3:17 PM     Provided History:  Tinnitus  ICD-10: Pulsatile tinnitus of right ear     Comparison: Outside brain MR 9/6/2022.     Technique:   Brain MRI: Axial diffusion-weighted with ADC map, T2-weighted,  turboFLAIR and T1-weighted images of the brain and axial T1-weighted  and coronal T2-weighted with fat saturation images centered on the  internal auditory canals were obtained without intravenous contrast.  Following intravenous administration of gadolinium, axial turboFLAIR  images of the brain and axial T1-weighted with fat saturation and  coronal T1-weighted images centered on the internal auditory canals  were obtained.     MRA Head:  Using a 3D time-of-flight image acquisition technique, MRA  of the major arteries at the base of the brain was obtained without  intravenous contrast. Three-dimensional reconstructions of the head  MRA were created, which were reviewed by the radiologist.     Head MRV: 2D time-of-flight MR venogram (MRV) of the head was  performed without intravenous contrast. Following intravenous  gadolinium-based contrast administration, a contrast enhanced MRV of  the intracranial vessels was performed.     Contrast: 6mL Gadavist     Findings:   Brain MRI:  Compared to outside brain MR 9/6/2022, there is unchanged arterial  vascular abutment of the distal right AICA with the cisternal portion  of the right cochlear nerve as seen on series 18, image 21, series 17,  image 33 and series 13, image 42.     No abnormal signal or enhancement along the course of the seventh and  eighth cranial nerves on either side. Vestibular and auditory  structures exhibit normal signal on T2-weighted images and no abnormal  enhancement.     Images of the whole brain demonstrate no mass lesion, no mass effect,  or midline shift. No intracranial hemorrhage  on  susceptibility-weighted images. There is no restricted diffusion.  Ventricles are proportionate to the cerebral sulci. No abnormal  enhancement.     Head MRA:  No significant stenosis or aneurysmal dilation of the major  intracranial arteries. Fenestrated anterior communicating artery is  patent. The left posterior communicating artery is patent. The right  posterior communicating artery is not well visualized.     Head MRV:  Major intracranial superficial and deep venous sinuses are patent.                                                                      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  1.  Head MRA: Major intracranial arteries are patent without  significant stenosis or aneurysm.  2.  Head MRV: Major intracranial superficial and deep venous sinuses  are patent.     I have personally reviewed the examination and initial interpretation  and I agree with the findings.     MIN MORA MD           Objective    Vitals - Patient Reported  Pain Score: Worst Pain (10)  Pain Loc: Head (ear pain, ringing in ear)    Physical Exam   Patient is alert and no in apparent acute distress,  mentation appears normal, judgement and insight intact, normal speech, no weakness observed    I discussed all my recommendations with Janeth Parra who verbalizes understanding and comfortable with the plan.     41 minutes spent on the date of the encounter doing video access, chart  review,  meds review, treatment plan, documentation and further activities as noted above    OBDULIA Francisco, CNP [facility]  Headache certified  [facility]    Video-Visit Details    Type of service:  Video Visit   Originating Location (pt. Location): Home  Distant Location (provider location):  Off-site  Platform used for Video Visit: Karan

## 2024-03-04 ENCOUNTER — TELEPHONE (OUTPATIENT)
Dept: NEUROLOGY | Facility: CLINIC | Age: 24
End: 2024-03-04
Payer: COMMERCIAL

## 2024-03-04 NOTE — TELEPHONE ENCOUNTER
Patient Contacted to schedule the following:    Appointment type: Return Headache  Provider: OBDULIA Francisco CNP  Return date: Around 5/28/2024  Specialty phone number: [phone]  Additional appointment(s) needed: N/A  Additional Notes: N/A    Spoke with patient, scheduled on 6/26. Patient declined earlier openings due to scheduling conflicts.    Felix Samuel on 3/4/2024 at 5:26 PM

## 2024-03-15 ENCOUNTER — ANCILLARY PROCEDURE (OUTPATIENT)
Dept: CT IMAGING | Facility: CLINIC | Age: 24
End: 2024-03-15
Attending: NURSE PRACTITIONER
Payer: COMMERCIAL

## 2024-03-15 DIAGNOSIS — H93.A1 PULSATILE TINNITUS OF RIGHT EAR: ICD-10-CM

## 2024-03-15 DIAGNOSIS — R42 DIZZINESS: ICD-10-CM

## 2024-03-15 PROCEDURE — 70498 CT ANGIOGRAPHY NECK: CPT | Mod: GC | Performed by: RADIOLOGY

## 2024-03-15 PROCEDURE — 70496 CT ANGIOGRAPHY HEAD: CPT | Mod: GC | Performed by: RADIOLOGY

## 2024-03-15 RX ORDER — IOPAMIDOL 755 MG/ML
70 INJECTION, SOLUTION INTRAVASCULAR ONCE
Status: COMPLETED | OUTPATIENT
Start: 2024-03-15 | End: 2024-03-15

## 2024-03-15 RX ADMIN — IOPAMIDOL 70 ML: 755 INJECTION, SOLUTION INTRAVASCULAR at 14:31

## 2024-03-15 NOTE — DISCHARGE INSTRUCTIONS

## 2024-03-22 NOTE — RESULT ENCOUNTER NOTE
Karlene Parra,   Your recent CTV results reviewed. No abnormal findings at this time. Follow up as discussed. Take care, Jo Ann

## 2024-04-17 ENCOUNTER — THERAPY VISIT (OUTPATIENT)
Dept: PHYSICAL THERAPY | Facility: CLINIC | Age: 24
End: 2024-04-17
Attending: NURSE PRACTITIONER
Payer: COMMERCIAL

## 2024-04-17 DIAGNOSIS — H93.A1 PULSATILE TINNITUS OF RIGHT EAR: ICD-10-CM

## 2024-04-17 DIAGNOSIS — R42 DIZZINESS: ICD-10-CM

## 2024-04-17 PROCEDURE — 97112 NEUROMUSCULAR REEDUCATION: CPT | Mod: GP | Performed by: PHYSICAL THERAPIST

## 2024-04-17 PROCEDURE — 97161 PT EVAL LOW COMPLEX 20 MIN: CPT | Mod: GP | Performed by: PHYSICAL THERAPIST

## 2024-04-17 NOTE — PROGRESS NOTES
PHYSICAL THERAPY EVALUATION  Type of Visit: Evaluation    See electronic medical record for Abuse and Falls Screening details.    Subjective       Presenting condition or subjective complaint: seeing neurology for treatment of migraines, she denies dizziness when sitting still, reports dizziness/sense of motion when she bends over or moves her head.  She has been getting injections for the migraines which has helped with pain but has not affected dizziness.  She reports these sx are getting worse to the point where she no longer bends over at all.  Bending over also makes tinnitus and headaches worse.      Hobbies/Interests: She continues to go to the gym and she does yoga but needs to avoid bending over and some days at the gym she can get symptoms when it is warm.      Patient goals for therapy: improve dizziness        Objective      GAIT:  unremarkable       VESTIBULAR EVALUATION    Cervicogenic Screen    Neck ROM Normal                                Oculomotor Screen    Ocular ROM Normal; provokes dizziness   Smooth Pursuit Normal; provokes dizziness   Saccades Normal; provokes dizziness   VOR Normal; provokes dizziness                    Infrared Goggle Exam Vestibular Suppressant in Last 24 Hours? No  Exam Completed With: Infrared goggles   Spontaneous Nystagmus Negative   Gaze Evoked Nystagmus Negative   Head Shake Horizontal Nystagmus Negative            Left Right   [place]-Hallpike Negative Negative        HSCC Supine Roll Test Negative Negative     Dynamic Visual Acuity (DVA)    Static Acuity (LogMar) 0   Horizontal Head Movement at 1 Hz (LogMar)    Horizontal Head Movement at 2 Hz (LogMar) .1   WNL; dizziness with 2hz testing, resolved < 1 min after test       Assessment & Plan   CLINICAL IMPRESSIONS  Medical Diagnosis: dizziness    Treatment Diagnosis: motion sensitivity, dizziness   Impression/Assessment: Patient is a 24 year old female with dizziness and motion sensitivity complaints.  The following  significant findings have been identified: Decreased activity tolerance and Dizziness. These impairments interfere with their ability to perform work tasks, recreational activities, and driving  as compared to previous level of function.     Clinical Decision Making (Complexity):  Clinical Presentation: Stable/Uncomplicated  Clinical Presentation Rationale: based on medical and personal factors listed in PT evaluation  Clinical Decision Making (Complexity): Low complexity    PLAN OF CARE  Treatment Interventions:  Interventions: Gait Training, Neuromuscular Re-education, Therapeutic Activity, Therapeutic Exercise, Self-Care/Home Management    Long Term Goals     PT Goal 1  Goal Identifier: head movement  Goal Description: tolerate turning head to check blind spot while driving  Target Date: 07/10/24  PT Goal 2  Goal Identifier: motion sensitivity  Goal Description: patient to tolerate bending over to  object  Target Date: 07/10/24      Frequency of Treatment: 4-6 visits  Duration of Treatment: 12 weeks    Risks and benefits of evaluation/treatment have been explained.   Patient/Family/caregiver agrees with Plan of Care.     Evaluation Time:     PT Eval, Low Complexity Minutes (99988): 20     Signing Clinician: Simeon Garcia, PT      [facility]                                                                                   OUTPATIENT PHYSICAL THERAPY      PLAN OF TREATMENT FOR OUTPATIENT REHABILITATION   Patient's Last Name, First Name, GERALDINEAlexusJaneth Perea YOB: 2000   Provider's Name   [facility]   Medical Record No.  [id number]     Onset Date: 02/28/24  Start of Care Date: 04/17/24     Medical Diagnosis:  dizziness      PT Treatment Diagnosis:  motion sensitivity, dizziness Plan of Treatment  Frequency/Duration: 4-6 visits/ 12 weeks    Certification date from 04/17/24 to 07/10/24         See note for plan of treatment  details and functional goals     Simeon Garcia, PT                         I CERTIFY THE NEED FOR THESE SERVICES FURNISHED UNDER        THIS PLAN OF TREATMENT AND WHILE UNDER MY CARE     (Physician attestation of this document indicates review and certification of the therapy plan).              Referring Provider:  Jo Ann Mckenna CNP    Initial Assessment  See Epic Evaluation- Start of Care Date: 04/17/24

## 2024-05-04 ENCOUNTER — HEALTH MAINTENANCE LETTER (OUTPATIENT)
Age: 24
End: 2024-05-04

## 2024-07-18 ENCOUNTER — TELEPHONE (OUTPATIENT)
Dept: NEUROLOGY | Facility: CLINIC | Age: 24
End: 2024-07-18

## 2024-07-18 ENCOUNTER — VIRTUAL VISIT (OUTPATIENT)
Dept: NEUROLOGY | Facility: CLINIC | Age: 24
End: 2024-07-18
Payer: COMMERCIAL

## 2024-07-18 VITALS — BODY MASS INDEX: 20.89 KG/M2 | HEIGHT: 66 IN | WEIGHT: 130 LBS

## 2024-07-18 DIAGNOSIS — G43.719 INTRACTABLE CHRONIC MIGRAINE WITHOUT AURA AND WITHOUT STATUS MIGRAINOSUS: ICD-10-CM

## 2024-07-18 DIAGNOSIS — G43.009 MIGRAINE WITHOUT AURA AND WITHOUT STATUS MIGRAINOSUS, NOT INTRACTABLE: Primary | ICD-10-CM

## 2024-07-18 PROCEDURE — G2211 COMPLEX E/M VISIT ADD ON: HCPCS | Performed by: NURSE PRACTITIONER

## 2024-07-18 PROCEDURE — 99214 OFFICE O/P EST MOD 30 MIN: CPT | Mod: 95 | Performed by: NURSE PRACTITIONER

## 2024-07-18 RX ORDER — FREMANEZUMAB-VFRM 225 MG/1.5ML
225 INJECTION SUBCUTANEOUS
Qty: 1.5 ML | Refills: 11 | Status: SHIPPED | OUTPATIENT
Start: 2024-07-18

## 2024-07-18 RX ORDER — TOPIRAMATE 25 MG/1
TABLET, FILM COATED ORAL
Qty: 90 TABLET | Refills: 6 | Status: SHIPPED | OUTPATIENT
Start: 2024-07-18

## 2024-07-18 ASSESSMENT — MIGRAINE DISABILITY ASSESSMENT (MIDAS)
HOW MANY DAYS IN THE PAST 3 MONTHS HAVE YOU HAD A HEADACHE: 24
HOW MANY DAYS DID YOU MISS WORK OR SCHOOL BECAUSE OF HEADACHES: 8
ON A SCALE FROM 0-10 ON AVERAGE HOW PAINFUL WERE HEADACHES: 10
HOW MANY DAYS WAS HOUSEWORK PRODUCTIVITY CUT IN HALF DUE TO HEADACHES: 12
HOW MANY DAYS DID YOU NOT DO HOUSEWORK BECAUSE OF HEADACHES: 13
HOW MANY DAYS WAS YOUR PRODUCTIVITY CUT IN HALF BECAUSE OF HEADACHES: 6
HOW OFTEN WERE SOCIAL ACTIVITIES MISSED DUE TO HEADACHES: 10
TOTAL SCORE: 49

## 2024-07-18 ASSESSMENT — HEADACHE IMPACT TEST (HIT 6)
HIT6 TOTAL SCORE: 68
WHEN YOU HAVE HEADACHES HOW OFTEN IS THE PAIN SEVERE: ALWAYS
HOW OFTEN HAVE YOU FELT FED UP OR IRRITATED BECAUSE OF YOUR HEADACHES: VERY OFTEN
WHEN YOU HAVE A HEADACHE HOW OFTEN DO YOU WISH YOU COULD LIE DOWN: VERY OFTEN
HOW OFTEN DID HEADACHS LIMIT CONCENTRATION ON WORK OR DAILY ACTIVITY: VERY OFTEN
HOW OFTEN DO HEADACHES LIMIT YOUR DAILY ACTIVITIES: VERY OFTEN
HOW OFTEN HAVE YOU FELT TOO TIRED TO WORK BECAUSE OF YOUR HEADACHES: VERY OFTEN

## 2024-07-18 ASSESSMENT — PAIN SCALES - GENERAL: PAINLEVEL: WORST PAIN (10)

## 2024-07-18 NOTE — TELEPHONE ENCOUNTER
PRIOR AUTHORIZATION DENIED    Medication: NURTEC 75 MG PO TBDP  Insurance Company: [employer] - Phone [phone] Fax [phone]  Denial Date: 7/18/2024  Denial Reason(s): : Our prior authorization criteria for rimegepant (NURTEC) have not been met. From the records that we have received, Nurtec was denied for these reasons:    1) Records show Nurtec will be used together with Aimovig, Ajovy, Emgality, or Vyepti.    Since the criteria have not been met, we are not able to approve. Please look at our list of covered drugs, also known as the formulary, to see what is covered. Prior authorization and quantity limits may apply to covered drugs.   Appeal Information: If provider would like to appeal we will need a detailed letter of medical necessity to start the process.   Patient Notified: No

## 2024-07-18 NOTE — LETTER
7/18/2024       RE: Janeth Parra  [address]   [facility] [address]       Dear Colleague,    Thank you for referring your patient, Janeth Parra, to the [facility]UROLOG[facility] [place] [facility]. Please see a copy of my visit note below.    [facility]    Headache Neurology Progress Note  July 18, 2024      Assessment/Plan:   Janeth Parra is a 24 year old  Right ear and now left ear -ringing 24/7 and sharp pain in the ears and feels like a heart beat in the ears.   Rebound headaches   Chronic daily headaches   Chronic migraines     Plan:  For persistent ringing and hearing loss -schedule follow up visit with ENT to see if hearing getting worse.   Establish PCP for other concerns.       Headache treatment   Acute Treatment:  -For acute treatment of mild headache, take OTC analgesics  as needed. Do not exceed more than 14 days per month to avoid medication overuse.  -For acute treatment of moderate to severe headache, take Nurtec  at the onset of headache as needed   -For headache related nausea, or as a rescue medicine for headache, take metoclopromide  as needed.     Preventive Treatment:  -For headache prevention, change Emgality to Ajovy montly injections   Side effects-allergic reaction or pain in the injection side or redness in the injection side. Unknown side effects with a long term use. Pregnancy is contraindicated.     Retrial of topiramate -a trial of topiramate 25 mg at bedtime for one week, then 50 mg at bedtime for one week, then take 75 mg (3 tabs) at bedtime. Side effects-teratogenic effect if pregnant and need to use reliable birth control and back up birth control, may decrease efficacy of birth control pills, stay hydrated and drink at least 10+glasses of water to decrease risk of kidney stones, may cause tingling in the hands and feet, taste changes, glaucoma, nausea, weight  "stable or loss, mood changes.  Let us know if you are losing weight while topiramate.     Follow up in 3-4 months or sooner if needed       The longitudinal plan of care for Janeth was addressed during this visit. Due to the added complexity in care, I will continue to support Janeth in the subsequent management of this  and with the ongoing continuity of care of this condition(s).     All of patient's questions were answered from the best of my knowledge.  Patient is in agreement with the plan.     32 minutes spent on the date of the encounter doing video access, chart  review, results review,  meds review, treatment plan, documentation and further activities as noted above    OBDULIA Francisco, CNP [facility]  Headache certified  [facility]        Subjective:    Janeth Parra returns for follow up of headache     Has been injecting Emgality for 6 months and it does not seem to be effective. Headaches severe all the time -every day-30 days out of 30 days per month. Pain level 10 out of 10 on the numeric pain scale every day.   Brain MRI, MRV, MRA, head and neck CTV -unrevealing for causes of ringing in the ears and headaches.   Eye exam in January and June 2024 -note from Janaury 2024 from [facility] reviewed and normal fundus exam but no   Saw at least several ENTs -hearing loss and still cannot hear in her right ear.   PT helped with dizziness and vertigo -has not had dizziness /vertigo for a while   Headache Tx  Amitriptyline, topiramate, metoclopromide, sumatriptan, rizatriptan,   OTC medications -ibuprofen and acetaminophen -almost daily since May   Ubrelvy 100 mg -used to help but stopped working   Emgality currently   Was on topiramate for 2 weeks -does not recall whether helpful   Eats a lot and appetite is ok  Staying hydrated and working out -walking on the treadmill causes dizziness. Does yoga and it does not worsen her symptoms.         Objective:  Vitals: Ht 1.676 m (5' 5.98\")   Wt 59 kg " (130 lb)   BMI 20.99 kg/m    General: Cooperative, NAD  Neurologic:  Mental Status: Fully alert, attentive and oriented. Speech clear and fluent.   Cranial Nerves: Facial movements symmetric.   Motor: No abnormal movements.      Pertinent Investigations:    Exam: CTV HEAD NECK W CONTRAST 3/15/2024 2:42 PM  Reconstruction by the Radiologist on 3D workstation     History:  PULSETILE TINNITUS AND DIZZINESS WITH HEAD TURNING LATERAL    WORSENING EVALUATE FOR VENOUSE COMPRESSION; Pulsatile tinnitus of  right ear; Dizziness.     Comparison: 12/21/2023 brain MRI      Technique: Post intravenous contrast imaging was obtained of the head  and neck with thin sections from the vertex through the lung apices  with a delay for venogram purposes. Patient was imaged in the neutral  position. I personally participated in the 3D reconstruction process  and interpretation of the final, archived 3D images on an independent  workstation.     Contrast Dose: Isovue 370 70CC     Findings:     HEAD:  Head CTV demonstrates no occlusion or thrombus within the major dural  and deep intracranial venous sinuses. There is no intracranial mass  affect, or midline shift. Dominant right-sided venous drainage. High  riding jugular bulb on the right. The major intracranial arteries are  grossly patent.      NECK:  No internal jugular vein thrombosis on the left or right.      Left:  Styloid process on the left measures: 2.1 cm. There is no  calcification of the stylohyoid ligament. The paravertebral venous  plexus is not abnormally dilated.      No narrowing or thrombus of the left internal jugular vein.      Right:  Styloid process on the right measures: 2.1 cm. There is no  calcification of the stylohyoid ligament. The paravertebral venous  plexus is not abnormally dilated.      No narrowing or thrombus of the right internal jugular vein.      Evaluation of the soft tissues of the neck reveals normal appearing  soft tissues; no lymphadenopathy.  Evaluation of the cervical spine  reveals no high grade spinal canal or neural foraminal stenosis. The  visualized lung apices are clear.                                                                       Impression:  1.  CTV of the head reveals no intracranial venous thrombus or  stenosis. High riding jugular bulb on the right.  2.  CTV of the neck reveals no venous thrombosis or significant  stenosis.     I have personally reviewed the examination and initial interpretation  and I agree with the findings.     DADA RIZO MD         MR BRAIN W/O & W CONTRAST, MRV BRAIN  W/O & W CONTRAST,  MRA BRAIN ([place] [facility]) W/O CONTRAST 12/21/2023 3:17 PM    Provided History:  Tinnitus  ICD-10: Pulsatile tinnitus of right ear    Comparison: Outside brain MR 9/6/2022.    Technique:  Brain MRI: Axial diffusion-weighted with ADC map, T2-weighted, ...   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  1.  Head MRA: Major intracranial arteries are patent without  significant stenosis or aneurysm.  2.  Head MRV: Major intracranial superficial and deep venous sinuses  are patent.    I have personally reviewed the examination and initial interpretation  and I agree with the findings. ...               MR BRAIN W/O & W CONTRAST, MRV BRAIN  W/O & W CONTRAST,  MRA BRAIN ([place] [facility]) W/O CONTRAST 12/21/2023 3:17 PM    Provided History:  Tinnitus  ICD-10: Pulsatile tinnitus of right ear    Comparison: Outside brain MR 9/6/2022.    Technique:  Brain MRI: Axial diffusion-weighted with ADC map, T2-weighted, ...   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  1.  Head MRA: Major intracranial arteries are patent without  significant stenosis or  aneurysm.  2.  Head MRV: Major intracranial superficial and deep venous sinuses  are patent.    I have personally reviewed the examination and initial interpretation  and I agree with the findings. ...    FUNDUS EXAM (SF, 20D/BIO)  Vitreous:   RIGHT (OD): clear  LEFT (OS): clear    Optic Nerve:   RIGHT (OD): healthy and well-perfused; distinct margins  LEFT (OS): healthy and well-perfused; distinct margins    C/D Ratio:   RIGHT (OD): 0.20   LEFT (OS): 0.25     Macula:   RIGHT (OD): flat with normal pigmentation; +FLR  LEFT (OS): flat with normal pigmentation; +FLR    Vessels:   RIGHT (OD): 2/3; healthy   LEFT (OS): 2/3; healthy     Retina:   RIGHT (OD): No holes, tears or detachments 360 degrees; normal   LEFT (OS): No holes, tears or detachments 360 degrees; normal              2/28/2024     2:13 PM 6/26/2024     1:45 PM 7/18/2024     7:48 AM   HIT-6   When you have headaches, how often is the pain severe 13 11 13   How often do headaches limit your ability to do usual daily activities including household work, work, school, or social activities? 13 11 11   When you have a headache, how often do you wish you could lie down? 13 13 11   In the past 4 weeks, how often have you felt too tired to do work or daily activities because of your headaches 13 11 11   In the past 4 weeks, how often have you felt fed up or irritated because of your headaches 13 11 11   In the past 4 weeks, how often did headaches limit your ability to concentrate on work or daily activities 13 11 11   HIT-6 Total Score 78 68 68           2/28/2024     2:13 PM 6/26/2024     1:47 PM 7/18/2024     7:49 AM   MIDAS - in the past three months:   On how many days did you miss work or school because of your headaches? 0 4 8   How many days was your productivity at work or school reduced by half or more because of your headaches? 0 3 6   On how many days did you not do household work because of your headaches? 90 7 13   How many days was your  productivity in household work reduced by half or more because of your headaches? 0 5 12   On how many days did you miss family, social, or leisure activities because of your headaches? 0 4 10   On how many days did you have a headache? 90 30 24   On a scale of 0-10, on average how painful were these headaches? 10 10 10   MIDAS Score 90 (IV - Severe Disability) 23 (IV - Severe Disability) 49 (IV - Severe Disability)            Again, thank you for allowing me to participate in the care of your patient.      Sincerely,    OBDULIA Holland CNP

## 2024-07-18 NOTE — PROGRESS NOTES
Virtual Visit Details    Type of service:  Video Visit   Originating Location (pt. Location): Home  Distant Location (provider location):  Off-site  Platform used for Video Visit: [facility]    Headache Neurology Progress Note  July 18, 2024      Assessment/Plan:   Janeth Parra is a 24 year old  Right ear and now left ear -ringing 24/7 and sharp pain in the ears and feels like a heart beat in the ears.   Rebound headaches   Chronic daily headaches   Chronic migraines     Plan:  For persistent ringing and hearing loss -schedule follow up visit with ENT to see if hearing getting worse.   Establish PCP for other concerns.       Headache treatment   Acute Treatment:  -For acute treatment of mild headache, take OTC analgesics  as needed. Do not exceed more than 14 days per month to avoid medication overuse.  -For acute treatment of moderate to severe headache, take Nurtec  at the onset of headache as needed   -For headache related nausea, or as a rescue medicine for headache, take metoclopromide  as needed.     Preventive Treatment:  -For headache prevention, change Emgality to Ajovy montly injections   Side effects-allergic reaction or pain in the injection side or redness in the injection side. Unknown side effects with a long term use. Pregnancy is contraindicated.     Retrial of topiramate -a trial of topiramate 25 mg at bedtime for one week, then 50 mg at bedtime for one week, then take 75 mg (3 tabs) at bedtime. Side effects-teratogenic effect if pregnant and need to use reliable birth control and back up birth control, may decrease efficacy of birth control pills, stay hydrated and drink at least 10+glasses of water to decrease risk of kidney stones, may cause tingling in the hands and feet, taste changes, glaucoma, nausea, weight stable or loss, mood changes.  Let us know if you are losing weight while topiramate.     Follow up in 3-4 months or sooner if needed  "      The longitudinal plan of care for Janeth was addressed during this visit. Due to the added complexity in care, I will continue to support Janeth in the subsequent management of this  and with the ongoing continuity of care of this condition(s).     All of patient's questions were answered from the best of my knowledge.  Patient is in agreement with the plan.     32 minutes spent on the date of the encounter doing video access, chart  review, results review,  meds review, treatment plan, documentation and further activities as noted above    OBDULIA Francisco, CNP [facility]  Headache certified  [facility]        Subjective:    Janeth Parra returns for follow up of headache     Has been injecting Emgality for 6 months and it does not seem to be effective. Headaches severe all the time -every day-30 days out of 30 days per month. Pain level 10 out of 10 on the numeric pain scale every day.   Brain MRI, MRV, MRA, head and neck CTV -unrevealing for causes of ringing in the ears and headaches.   Eye exam in January and June 2024 -note from [facility] 2024 from [facility] reviewed and normal fundus exam but no   Saw at least several ENTs -hearing loss and still cannot hear in her right ear.   PT helped with dizziness and vertigo -has not had dizziness /vertigo for a while   Headache Tx  Amitriptyline, topiramate, metoclopromide, sumatriptan, rizatriptan,   OTC medications -ibuprofen and acetaminophen -almost daily since May   Ubrelvy 100 mg -used to help but stopped working   Emgality currently   Was on topiramate for 2 weeks -does not recall whether helpful   Eats a lot and appetite is ok  Staying hydrated and working out -walking on the treadmill causes dizziness. Does yoga and it does not worsen her symptoms.         Objective:  Vitals: Ht 1.676 m (5' 5.98\")   Wt 59 kg (130 lb)   BMI 20.99 kg/m    General: Cooperative, NAD  Neurologic:  Mental Status: Fully alert, attentive and oriented. Speech clear " and fluent.   Cranial Nerves: Facial movements symmetric.   Motor: No abnormal movements.      Pertinent Investigations:    Exam: CTV HEAD NECK W CONTRAST 3/15/2024 2:42 PM  Reconstruction by the Radiologist on 3D workstation     History:  PULSETILE TINNITUS AND DIZZINESS WITH HEAD TURNING LATERAL    WORSENING EVALUATE FOR VENOUSE COMPRESSION; Pulsatile tinnitus of  right ear; Dizziness.     Comparison: 12/21/2023 brain MRI      Technique: Post intravenous contrast imaging was obtained of the head  and neck with thin sections from the vertex through the lung apices  with a delay for venogram purposes. Patient was imaged in the neutral  position. I personally participated in the 3D reconstruction process  and interpretation of the final, archived 3D images on an independent  workstation.     Contrast Dose: Isovue 370 70CC     Findings:     HEAD:  Head CTV demonstrates no occlusion or thrombus within the major dural  and deep intracranial venous sinuses. There is no intracranial mass  affect, or midline shift. Dominant right-sided venous drainage. High  riding jugular bulb on the right. The major intracranial arteries are  grossly patent.      NECK:  No internal jugular vein thrombosis on the left or right.      Left:  Styloid process on the left measures: 2.1 cm. There is no  calcification of the stylohyoid ligament. The paravertebral venous  plexus is not abnormally dilated.      No narrowing or thrombus of the left internal jugular vein.      Right:  Styloid process on the right measures: 2.1 cm. There is no  calcification of the stylohyoid ligament. The paravertebral venous  plexus is not abnormally dilated.      No narrowing or thrombus of the right internal jugular vein.      Evaluation of the soft tissues of the neck reveals normal appearing  soft tissues; no lymphadenopathy. Evaluation of the cervical spine  reveals no high grade spinal canal or neural foraminal stenosis. The  visualized lung apices are clear.                                                                        Impression:  1.  CTV of the head reveals no intracranial venous thrombus or  stenosis. High riding jugular bulb on the right.  2.  CTV of the neck reveals no venous thrombosis or significant  stenosis.     I have personally reviewed the examination and initial interpretation  and I agree with the findings.     DADA RIZO MD         MR BRAIN W/O & W CONTRAST, MRV BRAIN  W/O & W CONTRAST,  MRA BRAIN ([place] OF [facility]IM) W/O CONTRAST 12/21/2023 3:17 PM    Provided History:  Tinnitus  ICD-10: Pulsatile tinnitus of right ear    Comparison: Outside brain MR 9/6/2022.    Technique:  Brain MRI: Axial diffusion-weighted with ADC map, T2-weighted, ...   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  1.  Head MRA: Major intracranial arteries are patent without  significant stenosis or aneurysm.  2.  Head MRV: Major intracranial superficial and deep venous sinuses  are patent.    I have personally reviewed the examination and initial interpretation  and I agree with the findings. ...               MR BRAIN W/O & W CONTRAST, MRV BRAIN  W/O & W CONTRAST,  MRA BRAIN ([place] OF [facility]MI) W/O CONTRAST 12/21/2023 3:17 PM    Provided History:  Tinnitus  ICD-10: Pulsatile tinnitus of right ear    Comparison: Outside brain MR 9/6/2022.    Technique:  Brain MRI: Axial diffusion-weighted with ADC map, T2-weighted, ...   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  1.  Head MRA: Major intracranial arteries are patent without  significant stenosis or aneurysm.  2.  Head MRV: Major intracranial superficial and deep venous sinuses  are patent.    I have personally reviewed the examination and  initial interpretation  and I agree with the findings. ...    FUNDUS EXAM (SF, 20D/BIO)  Vitreous:   RIGHT (OD): clear  LEFT (OS): clear    Optic Nerve:   RIGHT (OD): healthy and well-perfused; distinct margins  LEFT (OS): healthy and well-perfused; distinct margins    C/D Ratio:   RIGHT (OD): 0.20   LEFT (OS): 0.25     Macula:   RIGHT (OD): flat with normal pigmentation; +FLR  LEFT (OS): flat with normal pigmentation; +FLR    Vessels:   RIGHT (OD): 2/3; healthy   LEFT (OS): 2/3; healthy     Retina:   RIGHT (OD): No holes, tears or detachments 360 degrees; normal   LEFT (OS): No holes, tears or detachments 360 degrees; normal              2/28/2024     2:13 PM 6/26/2024     1:45 PM 7/18/2024     7:48 AM   HIT-6   When you have headaches, how often is the pain severe 13 11 13   How often do headaches limit your ability to do usual daily activities including household work, work, school, or social activities? 13 11 11   When you have a headache, how often do you wish you could lie down? 13 13 11   In the past 4 weeks, how often have you felt too tired to do work or daily activities because of your headaches 13 11 11   In the past 4 weeks, how often have you felt fed up or irritated because of your headaches 13 11 11   In the past 4 weeks, how often did headaches limit your ability to concentrate on work or daily activities 13 11 11   HIT-6 Total Score 78 68 68           2/28/2024     2:13 PM 6/26/2024     1:47 PM 7/18/2024     7:49 AM   MIDAS - in the past three months:   On how many days did you miss work or school because of your headaches? 0 4 8   How many days was your productivity at work or school reduced by half or more because of your headaches? 0 3 6   On how many days did you not do household work because of your headaches? 90 7 13   How many days was your productivity in household work reduced by half or more because of your headaches? 0 5 12   On how many days did you miss family, social, or leisure  activities because of your headaches? 0 4 10   On how many days did you have a headache? 90 30 24   On a scale of 0-10, on average how painful were these headaches? 10 10 10   MIDAS Score 90 (IV - Severe Disability) 23 (IV - Severe Disability) 49 (IV - Severe Disability)

## 2024-07-18 NOTE — NURSING NOTE
Current patient location: [address] [facility] [address]    Is the patient currently in the state of [place]? YES    Visit mode:VIDEO    If the visit is dropped, the patient can be reconnected by: VIDEO VISIT: Text to cell phone:   Telephone Information:   Mobile [phone]       Will anyone else be joining the visit? NO  (If patient encounters technical issues they should call [phone]    How would you like to obtain your AVS? MyChart    Are changes needed to the allergy or medication list? No    Are refills needed on medications prescribed by this physician? NO    Reason for visit: Follow Up    Marietta PHILLIPS

## 2024-07-18 NOTE — PATIENT INSTRUCTIONS
Headache treatment   Acute Treatment:  -For acute treatment of mild headache, take OTC analgesics  as needed. Do not exceed more than 14 days per month to avoid medication overuse.  -For acute treatment of moderate to severe headache, take Nurtec  at the onset of headache as needed   -For headache related nausea, or as a rescue medicine for headache, take metoclopromide  as needed.     Preventive Treatment:  -For headache prevention, change Emgality to Ajovy montly injections   Side effects-allergic reaction or pain in the injection side or redness in the injection side. Unknown side effects with a long term use. Pregnancy is contraindicated.     Retrial of topiramate -a trial of topiramate 25 mg at bedtime for one week, then 50 mg at bedtime for one week, then take 75 mg (3 tabs) at bedtime. Side effects-teratogenic effect if pregnant and need to use reliable birth control and back up birth control, may decrease efficacy of birth control pills, stay hydrated and drink at least 10+glasses of water to decrease risk of kidney stones, may cause tingling in the hands and feet, taste changes, glaucoma, nausea, weight stable or loss, mood changes.  Let us know if you are losing weight while topiramate.     Follow up in 3-4 months or sooner if needed

## 2024-07-24 NOTE — TELEPHONE ENCOUNTER
Medication Appeal Initiation    Medication: NURTEC 75 MG PO TBDP  Appeal Start Date:  7/24/2024  Insurance Company: [employer] - Phone [phone] Fax [phone]   Insurance Phone:   Insurance Fax: [phone]  Comments:

## 2024-07-26 ENCOUNTER — TELEPHONE (OUTPATIENT)
Dept: NEUROLOGY | Facility: CLINIC | Age: 24
End: 2024-07-26
Payer: COMMERCIAL

## 2024-07-26 NOTE — TELEPHONE ENCOUNTER
Left Voicemail (1st Attempt) and Sent Mychart (1st Attempt) for the patient to call back and schedule the following:    Appointment type: Return Headache  Provider: Jo Ann STEINER CNP  Return date: On or near 1/25/25  Specialty phone number: [phone]  Additional appointment(s) needed: NA  Additonal Notes: 6mo follow up    Sola La on 7/26/2024 at 11:24 AM

## 2024-09-18 NOTE — TELEPHONE ENCOUNTER
MEDICATION APPEAL APPROVED    Medication: NURTEC 75 MG PO TBDP  Authorization Effective Date: 8/2/2024  Authorization Expiration Date: 8/2/2025  Approved Dose/Quantity:   Reference #:   [id number]  Appeal Insurance Company: [employer] - Phone [phone] Fax [phone]   Expected CoPay: $       CoPay Card Available:    Financial Assistance Needed:   Filling Pharmacy:  [id number] - [address] [[id number]]   Comments:

## 2025-05-17 ENCOUNTER — HEALTH MAINTENANCE LETTER (OUTPATIENT)
Age: 25
End: 2025-05-17

## 2025-07-01 ENCOUNTER — OFFICE VISIT (OUTPATIENT)
Dept: FAMILY MEDICINE | Facility: CLINIC | Age: 25
End: 2025-07-01
Payer: COMMERCIAL

## 2025-07-01 VITALS
SYSTOLIC BLOOD PRESSURE: 131 MMHG | RESPIRATION RATE: 16 BRPM | BODY MASS INDEX: 22.07 KG/M2 | DIASTOLIC BLOOD PRESSURE: 80 MMHG | HEART RATE: 96 BPM | HEIGHT: 67 IN | OXYGEN SATURATION: 100 % | TEMPERATURE: 98 F | WEIGHT: 140.6 LBS

## 2025-07-01 DIAGNOSIS — Z13.220 ENCOUNTER FOR LIPID SCREENING FOR CARDIOVASCULAR DISEASE: ICD-10-CM

## 2025-07-01 DIAGNOSIS — G43.809 OTHER MIGRAINE WITHOUT STATUS MIGRAINOSUS, NOT INTRACTABLE: ICD-10-CM

## 2025-07-01 DIAGNOSIS — H93.11 TINNITUS, RIGHT: ICD-10-CM

## 2025-07-01 DIAGNOSIS — Z13.6 ENCOUNTER FOR LIPID SCREENING FOR CARDIOVASCULAR DISEASE: ICD-10-CM

## 2025-07-01 DIAGNOSIS — R42 VERTIGO: ICD-10-CM

## 2025-07-01 DIAGNOSIS — R73.9 HYPERGLYCEMIA: ICD-10-CM

## 2025-07-01 DIAGNOSIS — Z00.00 WELL ADULT EXAM: Primary | ICD-10-CM

## 2025-07-01 LAB
BASOPHILS # BLD AUTO: 0 10E3/UL (ref 0–0.2)
BASOPHILS NFR BLD AUTO: 0 %
EOSINOPHIL # BLD AUTO: 0.1 10E3/UL (ref 0–0.7)
EOSINOPHIL NFR BLD AUTO: 1 %
ERYTHROCYTE [DISTWIDTH] IN BLOOD BY AUTOMATED COUNT: 12.5 % (ref 10–15)
EST. AVERAGE GLUCOSE BLD GHB EST-MCNC: 100 MG/DL
FOLATE SERPL-MCNC: 8.4 NG/ML (ref 4.6–34.8)
HBA1C MFR BLD: 5.1 % (ref 0–5.6)
HCT VFR BLD AUTO: 40.4 % (ref 35–47)
HGB BLD-MCNC: 13.1 G/DL (ref 11.7–15.7)
IMM GRANULOCYTES # BLD: 0 10E3/UL
IMM GRANULOCYTES NFR BLD: 0 %
LYMPHOCYTES # BLD AUTO: 1.8 10E3/UL (ref 0.8–5.3)
LYMPHOCYTES NFR BLD AUTO: 30 %
MCH RBC QN AUTO: 28.7 PG (ref 26.5–33)
MCHC RBC AUTO-ENTMCNC: 32.4 G/DL (ref 31.5–36.5)
MCV RBC AUTO: 89 FL (ref 78–100)
MONOCYTES # BLD AUTO: 0.4 10E3/UL (ref 0–1.3)
MONOCYTES NFR BLD AUTO: 7 %
NEUTROPHILS # BLD AUTO: 3.8 10E3/UL (ref 1.6–8.3)
NEUTROPHILS NFR BLD AUTO: 62 %
PLATELET # BLD AUTO: 311 10E3/UL (ref 150–450)
RBC # BLD AUTO: 4.56 10E6/UL (ref 3.8–5.2)
WBC # BLD AUTO: 6.1 10E3/UL (ref 4–11)

## 2025-07-01 PROCEDURE — 36415 COLL VENOUS BLD VENIPUNCTURE: CPT | Performed by: FAMILY MEDICINE

## 2025-07-01 PROCEDURE — 99395 PREV VISIT EST AGE 18-39: CPT | Mod: 25 | Performed by: FAMILY MEDICINE

## 2025-07-01 PROCEDURE — 83550 IRON BINDING TEST: CPT | Performed by: FAMILY MEDICINE

## 2025-07-01 PROCEDURE — 99214 OFFICE O/P EST MOD 30 MIN: CPT | Mod: 25 | Performed by: FAMILY MEDICINE

## 2025-07-01 PROCEDURE — 83540 ASSAY OF IRON: CPT | Performed by: FAMILY MEDICINE

## 2025-07-01 PROCEDURE — 84207 ASSAY OF VITAMIN B-6: CPT | Mod: 90 | Performed by: FAMILY MEDICINE

## 2025-07-01 PROCEDURE — 84630 ASSAY OF ZINC: CPT | Mod: 90 | Performed by: FAMILY MEDICINE

## 2025-07-01 PROCEDURE — 80053 COMPREHEN METABOLIC PANEL: CPT | Performed by: FAMILY MEDICINE

## 2025-07-01 PROCEDURE — 82728 ASSAY OF FERRITIN: CPT | Performed by: FAMILY MEDICINE

## 2025-07-01 PROCEDURE — 82746 ASSAY OF FOLIC ACID SERUM: CPT | Performed by: FAMILY MEDICINE

## 2025-07-01 PROCEDURE — 85025 COMPLETE CBC W/AUTO DIFF WBC: CPT | Performed by: FAMILY MEDICINE

## 2025-07-01 PROCEDURE — 99000 SPECIMEN HANDLING OFFICE-LAB: CPT | Performed by: FAMILY MEDICINE

## 2025-07-01 PROCEDURE — 80061 LIPID PANEL: CPT | Performed by: FAMILY MEDICINE

## 2025-07-01 PROCEDURE — 84443 ASSAY THYROID STIM HORMONE: CPT | Performed by: FAMILY MEDICINE

## 2025-07-01 PROCEDURE — 82607 VITAMIN B-12: CPT | Performed by: FAMILY MEDICINE

## 2025-07-01 PROCEDURE — 83735 ASSAY OF MAGNESIUM: CPT | Performed by: FAMILY MEDICINE

## 2025-07-01 PROCEDURE — 83036 HEMOGLOBIN GLYCOSYLATED A1C: CPT | Performed by: FAMILY MEDICINE

## 2025-07-01 PROCEDURE — 3079F DIAST BP 80-89 MM HG: CPT | Performed by: FAMILY MEDICINE

## 2025-07-01 PROCEDURE — 82306 VITAMIN D 25 HYDROXY: CPT | Performed by: FAMILY MEDICINE

## 2025-07-01 PROCEDURE — 3075F SYST BP GE 130 - 139MM HG: CPT | Performed by: FAMILY MEDICINE

## 2025-07-01 SDOH — HEALTH STABILITY: PHYSICAL HEALTH: ON AVERAGE, HOW MANY DAYS PER WEEK DO YOU ENGAGE IN MODERATE TO STRENUOUS EXERCISE (LIKE A BRISK WALK)?: 4 DAYS

## 2025-07-01 ASSESSMENT — SOCIAL DETERMINANTS OF HEALTH (SDOH): HOW OFTEN DO YOU GET TOGETHER WITH FRIENDS OR RELATIVES?: MORE THAN THREE TIMES A WEEK

## 2025-07-01 NOTE — PROGRESS NOTES
Preventive Care Visit  [facility] JUANA Salguero MD, Family Medicine  Jul 1, 2025  {Provider  Link to [facility] :001793}    {PROVIDER CHARTING PREFERENCE:670016}    Deepa Fowler is a 25 year old, presenting for the following:  Physical        7/1/2025     1:47 PM   Additional Questions   Roomed by Emiliana   Accompanied by Sister         7/1/2025     1:47 PM   Patient Reported Additional Medications   Patient reports taking the following new medications none          Would like to get blood drawn to check if vitamin levels are normal    HPI  ***   {MA/LPN/RN Pre-Provider Visit Orders- hCG/UA/Strep (Optional):443436}  {SUPERLIST (Optional):720569}  {additonal problems for provider to add (Optional):957472}  Advance Care Planning  {The storyboard will display whether the patient has ACP docs on file. Hover over the Code section in the storyboard to access the ACP documents. :994713}  {(AWV REQUIRED) Advance Care Planning Reviewed:435775}        7/1/2025   General Health   How would you rate your overall physical health? (!) FAIR   Feel stress (tense, anxious, or unable to sleep) Only a little   (!) STRESS CONCERN      7/1/2025   Nutrition   Three or more servings of calcium each day? Yes   Diet: Low salt   How many servings of fruit and vegetables per day? 4 or more   How many sweetened beverages each day? (!) 2         7/1/2025   Exercise   Days per week of moderate/strenous exercise 4 days         7/1/2025   Social Factors   Frequency of gathering with friends or relatives More than three times a week   Worry food won't last until get money to buy more No   Food not last or not have enough money for food? No   Do you have housing? (Housing is defined as stable permanent housing and does not include staying outside in a car, in a tent, in an abandoned building, in an overnight shelter, or couch-surfing.) Yes   Are you worried about losing your housing? No   Lack of transportation? No    Unable to get utilities (heat,electricity)? No         7/1/2025   Dental   Dentist two times every year? Yes         Today's PHQ-2 Score:       7/1/2025     1:35 PM   PHQ-2 ( 1999 [employer])   Q1: Little interest or pleasure in doing things 0   Q2: Feeling down, depressed or hopeless 0   PHQ-2 Score 0    Q1: Little interest or pleasure in doing things Not at all   Q2: Feeling down, depressed or hopeless Not at all   PHQ-2 Score 0       Patient-reported           7/1/2025   Substance Use   Alcohol more than 3/day or more than 7/wk No   Do you use any other substances recreationally? No     Social History     Tobacco Use    Smoking status: Never    Smokeless tobacco: Never   Vaping Use    Vaping status: Never Used   Substance Use Topics    Alcohol use: Never    Drug use: Never     {Provider  If there are gaps in the social history shown above, please follow the link to update and then refresh the note Link to Social and Substance History :875115}     {Mammogram Decision Support (Optional):325765}          7/1/2025   One time HIV Screening   Previous HIV test? No         7/1/2025   STI Screening   New sexual partner(s) since last STI/HIV test? No     History of abnormal Pap smear: { :229232}        Latest Ref Rng & Units 8/9/2023     9:10 AM 8/9/2023     8:15 AM   PAP / HPV   PAP   Negative for Intraepithelial Lesion or Malignancy (NILM)    HPV 16 DNA Negative Negative     HPV 18 DNA Negative Negative     Other HR HPV Negative Negative             7/1/2025   Contraception/Family Planning   Questions about contraception or family planning No   What are your periods like? Regular     {Provider  REQUIRED FOR AWV Use the storyboard to review patient history, after sections have been marked as reviewed, refresh note to capture documentation:575197}   Reviewed and updated as needed this visit by Provider                    {HISTORY OPTIONS (Optional):662645}    {ROS Picklists (Optional):336580}     Objective    Exam  BP  "131/80   Pulse 96   Temp 98  F (36.7  C) (Temporal)   Resp 16   Ht 1.71 m (5' 7.32\")   Wt 63.8 kg (140 lb 9.6 oz)   SpO2 100%   BMI 21.81 kg/m     Estimated body mass index is 21.81 kg/m  as calculated from the following:    Height as of this encounter: 1.71 m (5' 7.32\").    Weight as of this encounter: 63.8 kg (140 lb 9.6 oz).    Physical Exam  {Exam Choices (Optional):394169}        Signed Electronically by: Raymundo Salguero MD  {Email feedback regarding this note to [email or web address]   :101313}  " "7/1/2025   Contraception/Family Planning   Questions about contraception or family planning No   What are your periods like? Regular        Reviewed and updated as needed this visit by Provider                    BP Readings from Last 3 Encounters:   07/01/25 131/80   10/11/23 128/74   08/09/23 111/71    Wt Readings from Last 3 Encounters:   07/01/25 63.8 kg (140 lb 9.6 oz)   07/18/24 59 kg (130 lb)   02/28/24 59 kg (130 lb)                      Review of Systems  Constitutional, HEENT, cardiovascular, pulmonary, gi and gu systems are negative, except as otherwise noted.     Objective    Exam  /80   Pulse 96   Temp 98  F (36.7  C) (Temporal)   Resp 16   Ht 1.71 m (5' 7.32\")   Wt 63.8 kg (140 lb 9.6 oz)   SpO2 100%   BMI 21.81 kg/m     Estimated body mass index is 21.81 kg/m  as calculated from the following:    Height as of this encounter: 1.71 m (5' 7.32\").    Weight as of this encounter: 63.8 kg (140 lb 9.6 oz).    Physical Exam  Constitutional:       General: She is not in acute distress.     Appearance: Normal appearance. She is not ill-appearing.   HENT:      Head: Normocephalic and atraumatic.      Right Ear: Tympanic membrane and ear canal normal.      Left Ear: Tympanic membrane and ear canal normal.      Nose: Nose normal.      Mouth/Throat:      Mouth: Mucous membranes are moist.      Pharynx: Oropharynx is clear.   Eyes:      General: No scleral icterus.     Extraocular Movements: Extraocular movements intact.   Cardiovascular:      Rate and Rhythm: Normal rate and regular rhythm.      Heart sounds: Normal heart sounds. No murmur heard.  Pulmonary:      Effort: Pulmonary effort is normal. No respiratory distress.      Breath sounds: Normal breath sounds. No wheezing.   Abdominal:      General: Abdomen is flat.      Palpations: Abdomen is soft.      Tenderness: There is no abdominal tenderness.   Musculoskeletal:         General: Normal range of motion.      Cervical back: Normal range of " motion.   Skin:     General: Skin is warm.      Findings: No erythema or lesion.   Neurological:      General: No focal deficit present.      Mental Status: She is alert and oriented to person, place, and time.   Psychiatric:         Mood and Affect: Mood normal.         Behavior: Behavior normal.         Thought Content: Thought content normal.               Signed Electronically by: Raymundo Salguero MD

## 2025-07-02 ENCOUNTER — PATIENT OUTREACH (OUTPATIENT)
Dept: CARE COORDINATION | Facility: CLINIC | Age: 25
End: 2025-07-02
Payer: COMMERCIAL

## 2025-07-02 LAB
ALBUMIN SERPL BCG-MCNC: 4.3 G/DL (ref 3.5–5.2)
ALP SERPL-CCNC: 60 U/L (ref 40–150)
ALT SERPL W P-5'-P-CCNC: 41 U/L (ref 0–50)
ANION GAP SERPL CALCULATED.3IONS-SCNC: 10 MMOL/L (ref 7–15)
AST SERPL W P-5'-P-CCNC: 48 U/L (ref 0–45)
BILIRUB SERPL-MCNC: 0.2 MG/DL
BUN SERPL-MCNC: 9.8 MG/DL (ref 6–20)
CALCIUM SERPL-MCNC: 9.6 MG/DL (ref 8.8–10.4)
CHLORIDE SERPL-SCNC: 106 MMOL/L (ref 98–107)
CHOLEST SERPL-MCNC: 162 MG/DL
CREAT SERPL-MCNC: 0.64 MG/DL (ref 0.51–0.95)
EGFRCR SERPLBLD CKD-EPI 2021: >90 ML/MIN/1.73M2
FASTING STATUS PATIENT QL REPORTED: ABNORMAL
FASTING STATUS PATIENT QL REPORTED: NORMAL
FERRITIN SERPL-MCNC: 28 NG/ML (ref 6–175)
GLUCOSE SERPL-MCNC: 104 MG/DL (ref 70–99)
HCO3 SERPL-SCNC: 24 MMOL/L (ref 22–29)
HDLC SERPL-MCNC: 67 MG/DL
IRON BINDING CAPACITY (ROCHE): 313 UG/DL (ref 240–430)
IRON SATN MFR SERPL: 15 % (ref 15–46)
IRON SERPL-MCNC: 47 UG/DL (ref 37–145)
LDLC SERPL CALC-MCNC: 85 MG/DL
MAGNESIUM SERPL-MCNC: 2 MG/DL (ref 1.7–2.3)
NONHDLC SERPL-MCNC: 95 MG/DL
POTASSIUM SERPL-SCNC: 4.5 MMOL/L (ref 3.4–5.3)
PROT SERPL-MCNC: 7.2 G/DL (ref 6.4–8.3)
SODIUM SERPL-SCNC: 140 MMOL/L (ref 135–145)
TRIGL SERPL-MCNC: 48 MG/DL
TSH SERPL DL<=0.005 MIU/L-ACNC: 1.51 UIU/ML (ref 0.3–4.2)
VIT B12 SERPL-MCNC: 470 PG/ML (ref 232–1245)
VIT D+METAB SERPL-MCNC: 17 NG/ML (ref 20–50)

## 2025-07-04 LAB — ZINC SERPL-MCNC: 49.5 UG/DL

## 2025-07-05 ENCOUNTER — PATIENT OUTREACH (OUTPATIENT)
Dept: CARE COORDINATION | Facility: CLINIC | Age: 25
End: 2025-07-05
Payer: COMMERCIAL

## (undated) DEVICE — SU VICRYL 3-0 FS-1 27" J442H

## (undated) DEVICE — SU VICRYL 2-0 TIE 54" J615H

## (undated) DEVICE — GLOVE BIOGEL PI MICRO INDICATOR UNDERGLOVE SZ 7.0 48970

## (undated) DEVICE — PACK VAG HYST

## (undated) DEVICE — PREP POVIDONE IODINE SOLUTION 10% 120ML

## (undated) DEVICE — CATH TRAY FOLEY SURESTEP 16FR W/DRAIN BAG LF A300416A

## (undated) DEVICE — SU VICRYL 4-0 PS-2 18" UND J496H

## (undated) DEVICE — SOL NACL 0.9% IRRIG 500ML BOTTLE 2F7123

## (undated) DEVICE — SU VICRYL 2-0 CT-2 27" UND J269H

## (undated) DEVICE — PREP POVIDONE IODINE SCRUB 7.5% 120ML

## (undated) DEVICE — GLOVE BIOGEL PI MICRO SZ 6.5 48565

## (undated) DEVICE — SU VICRYL 3-0 SH 27" UND J416H

## (undated) DEVICE — DRSG GAUZE 4X4" TRAY 6939

## (undated) DEVICE — SU VICRYL 2-0 CT-1 27" UND J259H

## (undated) DEVICE — LINEN TOWEL PACK X5 5464

## (undated) DEVICE — SOL WATER IRRIG 500ML BOTTLE 2F7113

## (undated) DEVICE — BLADE KNIFE SURG 15 371115

## (undated) DEVICE — SU SILK 2-0 SH 30" K833H

## (undated) RX ORDER — DEXAMETHASONE SODIUM PHOSPHATE 4 MG/ML
INJECTION, SOLUTION INTRA-ARTICULAR; INTRALESIONAL; INTRAMUSCULAR; INTRAVENOUS; SOFT TISSUE
Status: DISPENSED
Start: 2023-08-09

## (undated) RX ORDER — GLYCOPYRROLATE 0.2 MG/ML
INJECTION INTRAMUSCULAR; INTRAVENOUS
Status: DISPENSED
Start: 2023-08-09

## (undated) RX ORDER — FENTANYL CITRATE 50 UG/ML
INJECTION, SOLUTION INTRAMUSCULAR; INTRAVENOUS
Status: DISPENSED
Start: 2023-08-09

## (undated) RX ORDER — PROPOFOL 10 MG/ML
INJECTION, EMULSION INTRAVENOUS
Status: DISPENSED
Start: 2023-08-09

## (undated) RX ORDER — ONDANSETRON 2 MG/ML
INJECTION INTRAMUSCULAR; INTRAVENOUS
Status: DISPENSED
Start: 2023-08-09

## (undated) RX ORDER — ACETAMINOPHEN 325 MG/1
TABLET ORAL
Status: DISPENSED
Start: 2023-08-09

## (undated) RX ORDER — OXYCODONE HYDROCHLORIDE 5 MG/1
TABLET ORAL
Status: DISPENSED
Start: 2023-08-09

## (undated) RX ORDER — KETOROLAC TROMETHAMINE 30 MG/ML
INJECTION, SOLUTION INTRAMUSCULAR; INTRAVENOUS
Status: DISPENSED
Start: 2023-08-09